# Patient Record
Sex: FEMALE | Race: WHITE | NOT HISPANIC OR LATINO | URBAN - METROPOLITAN AREA
[De-identification: names, ages, dates, MRNs, and addresses within clinical notes are randomized per-mention and may not be internally consistent; named-entity substitution may affect disease eponyms.]

---

## 2023-06-13 ENCOUNTER — WALK IN (OUTPATIENT)
Dept: URGENT CARE | Age: 38
End: 2023-06-13

## 2023-06-13 VITALS
SYSTOLIC BLOOD PRESSURE: 110 MMHG | WEIGHT: 138.67 LBS | OXYGEN SATURATION: 98 % | RESPIRATION RATE: 20 BRPM | TEMPERATURE: 97.8 F | HEIGHT: 64 IN | BODY MASS INDEX: 23.67 KG/M2 | HEART RATE: 80 BPM | DIASTOLIC BLOOD PRESSURE: 82 MMHG

## 2023-06-13 DIAGNOSIS — T59.811A SMOKE INHALATION: ICD-10-CM

## 2023-06-13 DIAGNOSIS — J34.89 IRRITATION, NOSE: Primary | ICD-10-CM

## 2023-06-13 PROCEDURE — 99203 OFFICE O/P NEW LOW 30 MIN: CPT | Performed by: NURSE PRACTITIONER

## 2023-06-13 RX ORDER — LACTIC ACID, L-, CITRIC ACID MONOHYDRATE, AND POTASSIUM BITARTRATE 90; 50; 20 MG/5G; MG/5G; MG/5G
GEL VAGINAL
COMMUNITY

## 2023-06-13 RX ORDER — TRANEXAMIC ACID 650 MG/1
TABLET ORAL EVERY 8 HOURS SCHEDULED
COMMUNITY

## 2023-06-13 RX ORDER — DEXTROMETHORPHAN HYDROBROMIDE AND PROMETHAZINE HYDROCHLORIDE 15; 6.25 MG/5ML; MG/5ML
SYRUP ORAL
COMMUNITY

## 2023-06-13 RX ORDER — AZITHROMYCIN 250 MG/1
TABLET, FILM COATED ORAL
COMMUNITY

## 2023-06-13 RX ORDER — ALBUTEROL SULFATE 90 UG/1
AEROSOL, METERED RESPIRATORY (INHALATION)
COMMUNITY

## 2023-06-13 ASSESSMENT — ENCOUNTER SYMPTOMS
SHORTNESS OF BREATH: 0
GASTROINTESTINAL NEGATIVE: 1
WHEEZING: 0
STRIDOR: 0
EYES NEGATIVE: 1
CONSTITUTIONAL NEGATIVE: 1
COUGH: 1

## 2023-10-06 DIAGNOSIS — O36.80X0 PREGNANCY WITH INCONCLUSIVE FETAL VIABILITY, SINGLE OR UNSPECIFIED FETUS: Primary | ICD-10-CM

## 2023-10-09 ENCOUNTER — OFFICE VISIT (OUTPATIENT)
Dept: OBGYN CLINIC | Facility: CLINIC | Age: 38
End: 2023-10-09
Payer: COMMERCIAL

## 2023-10-09 ENCOUNTER — LAB ENCOUNTER (OUTPATIENT)
Dept: LAB | Age: 38
End: 2023-10-09
Payer: COMMERCIAL

## 2023-10-09 VITALS — SYSTOLIC BLOOD PRESSURE: 136 MMHG | HEART RATE: 112 BPM | DIASTOLIC BLOOD PRESSURE: 77 MMHG | WEIGHT: 132.25 LBS

## 2023-10-09 DIAGNOSIS — O20.9 BLEEDING IN EARLY PREGNANCY: ICD-10-CM

## 2023-10-09 DIAGNOSIS — Z32.01 POSITIVE PREGNANCY TEST: Primary | ICD-10-CM

## 2023-10-09 DIAGNOSIS — O20.9 VAGINAL BLEEDING AFFECTING EARLY PREGNANCY: Primary | ICD-10-CM

## 2023-10-09 LAB
B-HCG SERPL-ACNC: ABNORMAL MIU/ML
CONTROL LINE PRESENT WITH A CLEAR BACKGROUND (YES/NO): YES YES/NO
KIT LOT #: NORMAL NUMERIC
PREGNANCY TEST, URINE: POSITIVE
PROGEST SERPL-MCNC: 11.6 NG/ML
RH BLOOD TYPE: POSITIVE

## 2023-10-09 PROCEDURE — 86900 BLOOD TYPING SEROLOGIC ABO: CPT

## 2023-10-09 PROCEDURE — 86901 BLOOD TYPING SEROLOGIC RH(D): CPT

## 2023-10-09 PROCEDURE — 84144 ASSAY OF PROGESTERONE: CPT

## 2023-10-09 PROCEDURE — 84702 CHORIONIC GONADOTROPIN TEST: CPT

## 2023-10-09 RX ORDER — PROGESTERONE 200 MG/1
200 CAPSULE ORAL NIGHTLY
Qty: 30 CAPSULE | Refills: 0 | Status: SHIPPED | OUTPATIENT
Start: 2023-10-09 | End: 2023-11-08

## 2023-10-09 NOTE — PROGRESS NOTES
Nura Guerrero is a 41year old female  Patient's last menstrual period was 2023 (exact date). Patient presents with:  New Patient  Gyn Problem: Early pregnancy bleeding, pt is 7wks, bleeding started this past Saturday   Other: Patient is very emotional  EP ordered US on 10/6/23 for 23  Patient said YES to student in the room   . Per LMP she is 7w1d. Her periods are regular. OBSTETRICS HISTORY:  OB History    Para Term  AB Living   2 1 1     1   SAB IAB Ectopic Multiple Live Births           1      # Outcome Date GA Lbr [place]/2nd Weight Sex Delivery Anes PTL Lv   2 Current            1 Term 21 39w0d  7 lb 10 oz (3.459 kg) M NORMAL SPONT   WILIAM       GYNE HISTORY:  Periods regular monthly      Sexual activity:   Yes      Partners:   Male                 MEDICAL HISTORY:  History reviewed. No pertinent past medical history. SURGICAL HISTORY:  History reviewed. No pertinent surgical history. SOCIAL HISTORY:  Social History    Socioeconomic History      Marital status: Not on file      Spouse name: Not on file      Number of children: Not on file      Years of education: Not on file      Highest education level: Not on file    Occupational History      Not on file    Tobacco Use      Smoking status: Never      Smokeless tobacco: Never    Vaping Use      Vaping Use: Never used    Substance and Sexual Activity      Alcohol use: Yes        Comment: socially      Drug use: Never      Sexual activity: Yes        Partners: Male    Other Topics      Concerns:        Not on file    Social History Narrative      Not on file    Social Determinants of Health  Financial Resource Strain: Not on file  Food Insecurity: Not on file  Transportation Needs: Not on file  Stress: Not on file  Housing Stability: Not on file    FAMILY HISTORY:  History reviewed. No pertinent family history. MEDICATIONS:  No current outpatient medications on file.     ALLERGIES:  No Known Allergies      PHYSICAL EXAM:   Pelvic Exam:  External Genitalia: normal appearance, hair distribution, and no lesions  Urethral Meatus:  normal in size, location, without lesions and prolapse  Bladder:  No fullness, masses or tenderness  Vagina:  Normal appearance without lesions, no abnormal discharge  Cervix:  Normal without tenderness on motion, dark red blood oozing from cervix   Uterus: normal in size, contour, position, mobility, without tenderness  Adnexa: normal without masses or tenderness  Perineum: normal  Anus: no hemorroids     Assessment & Plan:    1. Positive pregnancy test    - Urine Preg Test [79000]    2. Bleeding in early pregnancy    - HCG, Beta Subunit, Quant; Future  - Progesterone; Future  - Blood Type [E];  Future

## 2023-10-10 NOTE — PROGRESS NOTES
Pt aware of results & recommendations for progesterone vaginally at bedtime until 12 weeks. Verbalized understanding.

## 2023-10-11 ENCOUNTER — LAB ENCOUNTER (OUTPATIENT)
Dept: LAB | Age: 38
End: 2023-10-11
Payer: COMMERCIAL

## 2023-10-11 DIAGNOSIS — O20.9 VAGINAL BLEEDING AFFECTING EARLY PREGNANCY: ICD-10-CM

## 2023-10-11 LAB — B-HCG SERPL-ACNC: ABNORMAL MIU/ML

## 2023-10-11 PROCEDURE — 84702 CHORIONIC GONADOTROPIN TEST: CPT

## 2023-10-12 ENCOUNTER — TELEPHONE (OUTPATIENT)
Facility: CLINIC | Age: 38
End: 2023-10-12

## 2023-10-12 ENCOUNTER — ULTRASOUND ENCOUNTER (OUTPATIENT)
Facility: CLINIC | Age: 38
End: 2023-10-12
Payer: COMMERCIAL

## 2023-10-12 DIAGNOSIS — O36.80X0 PREGNANCY WITH INCONCLUSIVE FETAL VIABILITY, SINGLE OR UNSPECIFIED FETUS: Primary | ICD-10-CM

## 2023-10-12 DIAGNOSIS — O20.9 BLEEDING IN EARLY PREGNANCY: ICD-10-CM

## 2023-10-12 DIAGNOSIS — O20.9 BLEEDING IN EARLY PREGNANCY: Primary | ICD-10-CM

## 2023-10-12 NOTE — TELEPHONE ENCOUNTER
Spoke with patient. Per Ramya's recommendation she would like to proceed with a repeat HCG tomorrow and await Dr Bay recommendation from her [address]. Understanding verbalized.

## 2023-10-12 NOTE — TELEPHONE ENCOUNTER
Pt leaving her US very confused on why we did not schedule her with a doctor. Advised pt the nurse scheduled an ultrasound for her based on TK recommendations. Pt was surprised to find out the provider she saw in [facility] is not a doctor and is not seeing her today. Please advise.

## 2023-10-12 NOTE — PROGRESS NOTES
Patient aware of HCG results and recommendations. Scheduled for US today. Patient verbalized understanding.

## 2023-10-13 ENCOUNTER — LAB ENCOUNTER (OUTPATIENT)
Dept: LAB | Age: 38
End: 2023-10-13
Payer: COMMERCIAL

## 2023-10-13 DIAGNOSIS — O20.9 BLEEDING IN EARLY PREGNANCY: ICD-10-CM

## 2023-10-13 LAB — B-HCG SERPL-ACNC: 9084 MIU/ML

## 2023-10-13 PROCEDURE — 84702 CHORIONIC GONADOTROPIN TEST: CPT

## 2023-10-13 NOTE — TELEPHONE ENCOUNTER
Spoke with pt. Aware HCG order has been placed. She will go today at noon. Will call to discuss ultrasound once results have been reviewed. Verbalized understanding.

## 2023-10-13 NOTE — TELEPHONE ENCOUNTER
Message left per HIPAA form letting pt know her HCG order has been placed. To have HCG repeated today. To call with any questions.

## 2023-10-13 NOTE — TELEPHONE ENCOUNTER
Spoke with pt. Aware HCG is pending. Discussed possible miscarriage. Hemorrhage precautions gives. Advised to stop progesterone if HCG continues to drop. All questions answered. Verbalized understanding.

## 2023-10-16 ENCOUNTER — TELEPHONE (OUTPATIENT)
Facility: CLINIC | Age: 38
End: 2023-10-16

## 2023-10-16 DIAGNOSIS — O03.9 SAB (SPONTANEOUS ABORTION): Primary | ICD-10-CM

## 2023-10-16 NOTE — PROGRESS NOTES
Patient aware of test results and recommendations for repeat HCG level in 1 week. Order placed and routed to Dr. Danny Mckeon for signature. Patient verbalizes understanding.

## 2023-10-16 NOTE — TELEPHONE ENCOUNTER
Pt is still waiting for US results; pt is passing blood clots; pt states she tried to reach Dr on call this weekend and was unable to reach a Dr through the on call service; pls call.

## 2023-10-16 NOTE — TELEPHONE ENCOUNTER
Patient aware of US results and recommendation to repeat HCG level in 1 week. Patient reports light vaginal bleeding, decrease in cramping. Will proceed will blood work as recommended. If bleeding or pain increases patient instructed to call office. Order for HCG placed and routed to Dr. Hitesh Ruiz for signature. Patient verbalizes understanding.

## 2023-10-24 ENCOUNTER — LAB ENCOUNTER (OUTPATIENT)
Dept: LAB | Age: 38
End: 2023-10-24
Attending: OBSTETRICS & GYNECOLOGY

## 2023-10-24 DIAGNOSIS — O03.9 SAB (SPONTANEOUS ABORTION): ICD-10-CM

## 2023-10-24 LAB — B-HCG SERPL-ACNC: 187 MIU/ML

## 2023-10-24 PROCEDURE — 84702 CHORIONIC GONADOTROPIN TEST: CPT

## 2023-11-02 ENCOUNTER — TELEPHONE (OUTPATIENT)
Dept: OBGYN UNIT | Facility: HOSPITAL | Age: 38
End: 2023-11-02

## 2023-11-02 DIAGNOSIS — O03.9 SAB (SPONTANEOUS ABORTION): Primary | ICD-10-CM

## 2023-11-07 ENCOUNTER — LAB ENCOUNTER (OUTPATIENT)
Dept: LAB | Age: 38
End: 2023-11-07
Attending: OBSTETRICS & GYNECOLOGY
Payer: COMMERCIAL

## 2023-11-07 DIAGNOSIS — O03.9 SAB (SPONTANEOUS ABORTION): ICD-10-CM

## 2023-11-07 LAB — B-HCG SERPL-ACNC: 5 MIU/ML

## 2023-11-07 PROCEDURE — 84702 CHORIONIC GONADOTROPIN TEST: CPT | Performed by: OBSTETRICS & GYNECOLOGY

## 2023-11-13 DIAGNOSIS — O03.9 SAB (SPONTANEOUS ABORTION): Primary | ICD-10-CM

## 2023-11-13 NOTE — PROGRESS NOTES
Pt aware of results & recommendations to repeat HCG in 1 week left on voicemail per HIPAA form. Order pended. To call with any questions.

## 2023-11-30 ENCOUNTER — LAB ENCOUNTER (OUTPATIENT)
Dept: LAB | Age: 38
End: 2023-11-30
Attending: OBSTETRICS & GYNECOLOGY
Payer: COMMERCIAL

## 2023-11-30 DIAGNOSIS — O03.9 SAB (SPONTANEOUS ABORTION): ICD-10-CM

## 2023-11-30 LAB — B-HCG SERPL-ACNC: 12 MIU/ML

## 2023-11-30 PROCEDURE — 84702 CHORIONIC GONADOTROPIN TEST: CPT | Performed by: OBSTETRICS & GYNECOLOGY

## 2023-12-01 ENCOUNTER — TELEPHONE (OUTPATIENT)
Facility: CLINIC | Age: 38
End: 2023-12-01

## 2023-12-01 DIAGNOSIS — Z87.59 HX OF ONE MISCARRIAGE: Primary | ICD-10-CM

## 2023-12-01 NOTE — TELEPHONE ENCOUNTER
Pt concern her Hcg rising, some cramping    Denies bleeding    3 5/7 LMP 11/5/23, Pt's been TTC. Did HPT negative. After SAB would wait for hcg to go down close to zero before TTC. Discussed with Dr. Danny Mckeon recommends: Check hcg in 2d & progesterone pended. If uc or  bleeding to call office. Patient verbalized understanding, agreed to and intend to comply with plan of care.

## 2023-12-01 NOTE — TELEPHONE ENCOUNTER
Pt calling to speak to the nurse about her HCG levels rising now after a miscarriage. Pt states they were watching her Hcg closely and she has been trying so she would like to know next steps. Please advise.

## 2023-12-04 ENCOUNTER — LAB ENCOUNTER (OUTPATIENT)
Dept: LAB | Age: 38
End: 2023-12-04
Payer: COMMERCIAL

## 2023-12-04 DIAGNOSIS — Z87.59 HX OF ONE MISCARRIAGE: ICD-10-CM

## 2023-12-04 LAB
B-HCG SERPL-ACNC: 203 MIU/ML
PROGEST SERPL-MCNC: 27.5 NG/ML

## 2023-12-04 PROCEDURE — 84702 CHORIONIC GONADOTROPIN TEST: CPT

## 2023-12-04 PROCEDURE — 84144 ASSAY OF PROGESTERONE: CPT

## 2023-12-04 NOTE — TELEPHONE ENCOUNTER
Pt called HPT +, wanted to know when to do lab test.     Advised to complete hcg progesterone when she's able to determine next POC. Patient verbalized understanding, agreed to and intend to comply with plan of care.

## 2023-12-05 DIAGNOSIS — Z87.59 HX OF ONE MISCARRIAGE: Primary | ICD-10-CM

## 2023-12-05 DIAGNOSIS — O09.299 HISTORY OF MISCARRIAGE, CURRENTLY PREGNANT: ICD-10-CM

## 2023-12-05 NOTE — PROGRESS NOTES
Pt aware of results. LMP- 11/5/23 4 weeks 2 days G-3 P-1 SAB 10/7/2023 HCG down to 5. +HPT 12/4/23. Denies bleeding or abdominal pain. Aware HCG is 203. Will repeat HCG tomorrow to make sure rising appropriately. Order pended. Will route to [facility] for any further recommendations and call pt. To call  the office with any vaginal bleeding or abdominal pain. Verbalized understanding.

## 2023-12-06 ENCOUNTER — LAB ENCOUNTER (OUTPATIENT)
Dept: LAB | Age: 38
End: 2023-12-06
Payer: COMMERCIAL

## 2023-12-06 DIAGNOSIS — O09.299 HISTORY OF MISCARRIAGE, CURRENTLY PREGNANT: ICD-10-CM

## 2023-12-06 LAB — B-HCG SERPL-ACNC: 459 MIU/ML

## 2023-12-06 PROCEDURE — 84702 CHORIONIC GONADOTROPIN TEST: CPT

## 2023-12-07 DIAGNOSIS — Z87.59 HX OF ONE MISCARRIAGE: Primary | ICD-10-CM

## 2023-12-08 ENCOUNTER — LAB ENCOUNTER (OUTPATIENT)
Dept: LAB | Age: 38
End: 2023-12-08
Payer: COMMERCIAL

## 2023-12-08 DIAGNOSIS — Z87.59 HX OF ONE MISCARRIAGE: ICD-10-CM

## 2023-12-08 LAB — B-HCG SERPL-ACNC: 947 MIU/ML

## 2023-12-08 PROCEDURE — 84702 CHORIONIC GONADOTROPIN TEST: CPT

## 2023-12-13 ENCOUNTER — TELEPHONE (OUTPATIENT)
Facility: CLINIC | Age: 38
End: 2023-12-13

## 2023-12-13 NOTE — TELEPHONE ENCOUNTER
Discussed hcg/progesterone results to schedule US/FOB in 2wks. Transferred PSR to schedule appt. Patient verbalized understanding, agreed to and intend to comply with plan of care.

## 2023-12-29 ENCOUNTER — TELEPHONE (OUTPATIENT)
Dept: FAMILY MEDICINE CLINIC | Facility: CLINIC | Age: 38
End: 2023-12-29

## 2023-12-29 ENCOUNTER — TELEPHONE (OUTPATIENT)
Facility: CLINIC | Age: 38
End: 2023-12-29

## 2023-12-29 NOTE — TELEPHONE ENCOUNTER
PT dropped off MCF to be filled out by provider and the faxed to:       [address]    Fax # [phone] ty

## 2024-01-12 ENCOUNTER — INITIAL PRENATAL (OUTPATIENT)
Dept: OBGYN CLINIC | Facility: CLINIC | Age: 39
End: 2024-01-12
Payer: COMMERCIAL

## 2024-01-12 VITALS
BODY MASS INDEX: 22.58 KG/M2 | HEART RATE: 72 BPM | HEIGHT: 64 IN | SYSTOLIC BLOOD PRESSURE: 115 MMHG | WEIGHT: 132.25 LBS | DIASTOLIC BLOOD PRESSURE: 76 MMHG

## 2024-01-12 DIAGNOSIS — Z12.4 CERVICAL CANCER SCREENING: ICD-10-CM

## 2024-01-12 DIAGNOSIS — Z13.79 GENETIC SCREENING: ICD-10-CM

## 2024-01-12 DIAGNOSIS — Z87.59 CONFIRM FETAL VIABILITY WITH HISTORY OF MISCARRIAGE, ULTRASOUND: ICD-10-CM

## 2024-01-12 DIAGNOSIS — O09.521 AMA (ADVANCED MATERNAL AGE) MULTIGRAVIDA 35+, FIRST TRIMESTER: ICD-10-CM

## 2024-01-12 DIAGNOSIS — O36.80X0 CONFIRM FETAL VIABILITY WITH HISTORY OF MISCARRIAGE, ULTRASOUND: ICD-10-CM

## 2024-01-12 DIAGNOSIS — Z34.91 INITIAL OBSTETRIC VISIT IN FIRST TRIMESTER: Primary | ICD-10-CM

## 2024-01-12 PROCEDURE — 3078F DIAST BP <80 MM HG: CPT

## 2024-01-12 PROCEDURE — 87086 URINE CULTURE/COLONY COUNT: CPT

## 2024-01-12 PROCEDURE — 87624 HPV HI-RISK TYP POOLED RSLT: CPT

## 2024-01-12 PROCEDURE — 87591 N.GONORRHOEAE DNA AMP PROB: CPT

## 2024-01-12 PROCEDURE — 3008F BODY MASS INDEX DOCD: CPT

## 2024-01-12 PROCEDURE — 87491 CHLMYD TRACH DNA AMP PROBE: CPT

## 2024-01-12 PROCEDURE — 3074F SYST BP LT 130 MM HG: CPT

## 2024-01-12 PROCEDURE — 88175 CYTOPATH C/V AUTO FLUID REDO: CPT

## 2024-01-12 NOTE — PROGRESS NOTES
Initial OB - 9w3d     DARREN by LMP - her initial US is scheduled 24    OBhx - ,  - no complications   PMHx - . Denies blood transfusion, HIV, hepatitis, HSV, VTE or congenital heart defects   Psurghx - denies  Last pap Unsure, Pap done today     38 year old , EDC by LMP   -Aneuploidy screening discussed     -NIPT - wants - will do at next visit     -Carrier screen - she had it done with her last pregnancy, delivered in [place], will bring copy of the results, does not think she has any positive findings.     AMA   -NIPT   -L2US, Growths, NSTs

## 2024-01-13 ENCOUNTER — TELEPHONE (OUTPATIENT)
Dept: OBGYN CLINIC | Facility: CLINIC | Age: 39
End: 2024-01-13

## 2024-01-13 NOTE — TELEPHONE ENCOUNTER
Returned patient's page.  No answer.  Per patient message on [employer], patient is experiencing bleeding and scheduled for ultrasound on 1/18/2024.  Patient had called to inquire if earlier ultrasound appointment could be made.  Telephone message left for patient.  Patient advised to report to the emergency room if severe abdominal/pelvic pain and/or heavy vaginal bleeding.  Patient advised to contact the office on Monday to inquire about ultrasound appointments if she desires.  Precautions given via telephone message.    Cecily Dukes MD   [facility] - OBGYN

## 2024-01-15 LAB
C TRACH DNA SPEC QL NAA+PROBE: NEGATIVE
HPV I/H RISK 1 DNA SPEC QL NAA+PROBE: NEGATIVE
N GONORRHOEA DNA SPEC QL NAA+PROBE: NEGATIVE

## 2024-01-18 ENCOUNTER — ULTRASOUND ENCOUNTER (OUTPATIENT)
Facility: CLINIC | Age: 39
End: 2024-01-18
Payer: COMMERCIAL

## 2024-01-18 LAB
.: NORMAL
.: NORMAL

## 2024-02-08 ENCOUNTER — TELEPHONE (OUTPATIENT)
Facility: CLINIC | Age: 39
End: 2024-02-08

## 2024-02-09 ENCOUNTER — ROUTINE PRENATAL (OUTPATIENT)
Facility: CLINIC | Age: 39
End: 2024-02-09
Payer: COMMERCIAL

## 2024-02-09 ENCOUNTER — TELEPHONE (OUTPATIENT)
Facility: CLINIC | Age: 39
End: 2024-02-09

## 2024-02-09 VITALS
BODY MASS INDEX: 22.88 KG/M2 | DIASTOLIC BLOOD PRESSURE: 72 MMHG | HEART RATE: 97 BPM | WEIGHT: 134 LBS | HEIGHT: 64 IN | SYSTOLIC BLOOD PRESSURE: 112 MMHG

## 2024-02-09 DIAGNOSIS — Z3A.13 13 WEEKS GESTATION OF PREGNANCY: Primary | ICD-10-CM

## 2024-02-09 DIAGNOSIS — O46.8X1 SUBCHORIONIC HEMATOMA IN FIRST TRIMESTER, SINGLE OR UNSPECIFIED FETUS: ICD-10-CM

## 2024-02-09 DIAGNOSIS — O41.8X10 SUBCHORIONIC HEMATOMA IN FIRST TRIMESTER, SINGLE OR UNSPECIFIED FETUS: ICD-10-CM

## 2024-02-09 DIAGNOSIS — O09.521 AMA (ADVANCED MATERNAL AGE) MULTIGRAVIDA 35+, FIRST TRIMESTER: ICD-10-CM

## 2024-02-09 PROCEDURE — 3008F BODY MASS INDEX DOCD: CPT | Performed by: STUDENT IN AN ORGANIZED HEALTH CARE EDUCATION/TRAINING PROGRAM

## 2024-02-09 PROCEDURE — 3074F SYST BP LT 130 MM HG: CPT | Performed by: STUDENT IN AN ORGANIZED HEALTH CARE EDUCATION/TRAINING PROGRAM

## 2024-02-09 PROCEDURE — 3078F DIAST BP <80 MM HG: CPT | Performed by: STUDENT IN AN ORGANIZED HEALTH CARE EDUCATION/TRAINING PROGRAM

## 2024-02-09 NOTE — PROGRESS NOTES
Initial OB - 13w3d     Discussed subchorionic bleed.  No vaginal bleeding.   No ctx.      DARREN by LMP - her initial US is scheduled 24    38 year old , EDC by LMP   - Carrier in previous preg neg  [ ] follow up NIPT    AMA   -- NIPT as above  [ ] follow up MFM and L2US  [ ] Growths, NSTs     Subchorionic hemorrhage  <1 cm on dating US.  No VB.  Provided reassurance.  Precautions discussed if vaginal bleed

## 2024-02-09 NOTE — TELEPHONE ENCOUNTER
13 3/7 jimenes    AMA    Pt request for NIPS lab draw per Dr. Rodas    Patients name &  verified on lab tubes with patient. NIPS screen lab drawn, patient tolerated well. Specimen placed at . [employer] access, call in 2 weeks for result, Cautioned patient may receive results via email from [employer] that includes gender results discussed. Patient verbalized understanding.       
Infectious Disease

## 2024-02-13 LAB
AMB EXT MYRIAD TRISOMY 13: NEGATIVE
AMB EXT MYRIAD TRISOMY 18: NEGATIVE
AMB EXT MYRIAD TRISOMY 21: NEGATIVE

## 2024-02-15 ENCOUNTER — TELEPHONE (OUTPATIENT)
Facility: CLINIC | Age: 39
End: 2024-02-15

## 2024-02-21 ENCOUNTER — TELEPHONE (OUTPATIENT)
Facility: CLINIC | Age: 39
End: 2024-02-21

## 2024-02-21 NOTE — TELEPHONE ENCOUNTER
NIPS negative, gender result release in pt's [employer] portal. Patient verbalized understanding, agreed to and intend to comply with plan of care.

## 2024-03-06 ENCOUNTER — ROUTINE PRENATAL (OUTPATIENT)
Dept: OBGYN CLINIC | Facility: CLINIC | Age: 39
End: 2024-03-06
Payer: COMMERCIAL

## 2024-03-06 VITALS
DIASTOLIC BLOOD PRESSURE: 75 MMHG | SYSTOLIC BLOOD PRESSURE: 122 MMHG | HEIGHT: 64 IN | BODY MASS INDEX: 23.22 KG/M2 | WEIGHT: 136 LBS | HEART RATE: 93 BPM

## 2024-03-06 DIAGNOSIS — Z34.90 PRENATAL CARE, ANTEPARTUM (HCC): Primary | ICD-10-CM

## 2024-03-06 PROCEDURE — 3074F SYST BP LT 130 MM HG: CPT | Performed by: STUDENT IN AN ORGANIZED HEALTH CARE EDUCATION/TRAINING PROGRAM

## 2024-03-06 PROCEDURE — 3078F DIAST BP <80 MM HG: CPT | Performed by: STUDENT IN AN ORGANIZED HEALTH CARE EDUCATION/TRAINING PROGRAM

## 2024-03-06 PROCEDURE — 3008F BODY MASS INDEX DOCD: CPT | Performed by: STUDENT IN AN ORGANIZED HEALTH CARE EDUCATION/TRAINING PROGRAM

## 2024-03-06 NOTE — PATIENT INSTRUCTIONS
To schedule your appointment for your 20 week ultrasound, call:    [facility] - [place], [place]   [facility]  [facility])  [address] [place] [address], [facility] [address]    [phone]

## 2024-03-06 NOTE — PROGRESS NOTES
KENDAL - 17w1d     Pt feeling some flutters of fetal movement  Has some air hunger, not severe, reassured. No chest pain  Pt is  and notices is more fatigued in general, too - reassured most likely common sx of pregnancy      38 year old , EDC by LMP c/w 10wk US  - Carrier in previous preg neg  - NIPT neg    AMA   -- NIPT as above  [ ] follow up MFM and L2US - ordered, advised to schedule appt  [ ] Growths, NSTs     Subchorionic hemorrhage  <1 cm on dating US.  No VB.  Provided reassurance.  Precautions discussed if vaginal bleed

## 2024-03-15 ENCOUNTER — TELEPHONE (OUTPATIENT)
Facility: CLINIC | Age: 39
End: 2024-03-15

## 2024-03-28 ENCOUNTER — OFFICE VISIT (OUTPATIENT)
Dept: PERINATAL CARE | Facility: HOSPITAL | Age: 39
End: 2024-03-28
Attending: STUDENT IN AN ORGANIZED HEALTH CARE EDUCATION/TRAINING PROGRAM
Payer: COMMERCIAL

## 2024-03-28 ENCOUNTER — ULTRASOUND ENCOUNTER (OUTPATIENT)
Dept: PERINATAL CARE | Facility: HOSPITAL | Age: 39
End: 2024-03-28
Attending: OBSTETRICS & GYNECOLOGY
Payer: COMMERCIAL

## 2024-03-28 VITALS
SYSTOLIC BLOOD PRESSURE: 103 MMHG | HEART RATE: 74 BPM | WEIGHT: 139 LBS | HEIGHT: 64 IN | BODY MASS INDEX: 23.73 KG/M2 | DIASTOLIC BLOOD PRESSURE: 64 MMHG

## 2024-03-28 DIAGNOSIS — O09.522 MULTIGRAVIDA OF ADVANCED MATERNAL AGE IN SECOND TRIMESTER (HCC): Primary | ICD-10-CM

## 2024-03-28 DIAGNOSIS — Z82.79 FAMILY HISTORY OF CONGENITAL HEART DISEASE: ICD-10-CM

## 2024-03-28 DIAGNOSIS — O09.529 AMA (ADVANCED MATERNAL AGE) MULTIGRAVIDA 35+ (HCC): ICD-10-CM

## 2024-03-28 PROCEDURE — 76811 OB US DETAILED SNGL FETUS: CPT | Performed by: OBSTETRICS & GYNECOLOGY

## 2024-03-28 NOTE — PROGRESS NOTES
Outpatient Maternal-Fetal Medicine Consultation    Dear Dr. Arriaga    Thank you for requesting ultrasound evaluation and maternal fetal medicine consultation on your patient Mine Patel.  As you are aware she is a 38 year old female  with a jimenes pregnancy and an Estimated Date of Delivery: 24.  A maternal-fetal medicine consultation was requested secondary to Advanced Maternal Age.  Her prenatal records and labs were reviewed.    Her son was noted to have a hole in his heart that they are watching.  They do think it is between the 2 upper chambers.  He is being observed until age 3 when they will determine if a procedure is needed or not.    She is worried that something could happen during this pregnancy.  She now has friends who have had abnormal outcomes.  She has had a first trimester loss   Her previous pregnancy was uncomplicated.    ROS    HISTORY  OB History    Para Term  AB Living   3 1 1   1 1   SAB IAB Ectopic Multiple Live Births           1      # Outcome Date GA Lbr [place]/2nd Weight Sex Delivery Anes PTL Lv   3 Current            2 Term 21 39w0d  7 lb 10 oz (3.459 kg) M NORMAL SPONT   WILIAM      Birth Comments: has a hole in his heart, discovered at birth, can't do much until he is 4yo   1 AB                Allergies:  No Known Allergies   Current Meds:  Current Outpatient Medications   Medication Sig Dispense Refill    [id number] OR           HISTORY:  No past medical history on file.   No past surgical history on file.   Family History   Problem Relation Age of Onset    Hyperlipidemia Maternal Grandfather     Ovarian Cancer Paternal Grandmother       Social History     Socioeconomic History    Marital status:    Tobacco Use    Smoking status: Never    Smokeless tobacco: Never   Vaping Use    Vaping Use: Never used   Substance and Sexual Activity    Alcohol use: Yes     Comment: socially    Drug use: Never    Sexual activity: Yes      Partners: Male     Social Determinants of Health     Financial Resource Strain: Low Risk  (1/5/2024)    Financial Resource Strain     Difficulty of Paying Living Expenses: Not hard at all     Med Affordability: No   Food Insecurity: No Food Insecurity (1/5/2024)    Food Insecurity     Food Insecurity: Never true   Transportation Needs: No Transportation Needs (1/5/2024)    Transportation Needs     Lack of Transportation: No   Stress: No Stress Concern Present (1/5/2024)    Stress     Feeling of Stress : No   Housing Stability: Low Risk  (1/5/2024)    Housing Stability     Housing Instability: No          PHYSICAL EXAMINATION:  /64 (BP Location: Right arm, Patient Position: Sitting, Cuff Size: adult)   Pulse 74   Ht 5' 4\" (1.626 m)   Wt 139 lb (63 kg)   LMP 11/07/2023 (Exact Date)   BMI 23.86 kg/m²   Physical Exam  Constitutional:       Appearance: Normal appearance.   Abdominal:      Palpations: Abdomen is soft.      Tenderness: There is no abdominal tenderness.   Neurological:      Mental Status: She is alert.   Psychiatric:         Mood and Affect: Mood normal.         Behavior: Behavior normal.         OBSTETRIC ULTRASOUND  The patient had a level II ultrasound today which revealed size consistent with dates and a normal detailed anatomic survey.  Ultrasound Findings:  Single IUP in cephalic presentation.    Placenta is posterior.   A 3 vessel cord is noted.  Cardiac activity is present at 152 bpm   g ( 0 lb 13 oz);   MVP is 4.5 cm .     The fetal measurements are consistent with the established EDC. No ultrasound evidence of structural abnormalities are seen today. The nasal bone is present. No ultrasound evidence of markers for aneuploidy are seen. She understands that ultrasound exam cannot exclude genetic abnormalities and that genetic testing is recommended. The limitations of ultrasound were discussed.     Uterus and adnexa appeared normal  today on US  See PACS/Imaging Tab For Complete  Ultrasound Report  I interpreted the results and reviewed them with the patient.    DISCUSSION  During her visit we discussed and reviewed the following issues:  ADVANCED MATERNAL AGE    Background  I reviewed with the patient that pregnancies in women of advanced maternal age (35 or older at delivery) are associated with elevated risks. Specifically, there is a higher rate of:  Fetal malformations  Preeclampsia  Gestational diabetes  Intrauterine fetal death    As a result, enhanced pregnancy surveillance is advised for these patients including a comprehensive ultrasound to assess for fetal malformations (at 20 weeks) and a third trimester ultrasound assessment for fetal growth (at 32 weeks). In addition, weekly NST's (initiating at 36 weeks gestation for women 35-39 years and at 32 weeks gestation for women 40 years and older) are also advised. Routine obstetric care is more than adequate to assess for gestational diabetes and preeclampsia; hence, no further significant alterations in obstetric care are advised.    Medical Complications    Women 35 years of age or older can expect to experience two to three fold higher rates of hospitalization,  delivery, and pregnancy-related complications when compared to their younger counterparts.  The two most common medical problems complicating these  pregnanccies are hypertension and diabetes.   The incidence of preeclampsia in the general obstetric population is 3 to 4 percent; this increases to 5 to 10 percent in women over age 40 and is as high as 35 percent in women over age 50.   The incidence of gestational diabetes in the general obstetric population is 3 percent, rising to 7 to 12 percent in women over age 40 and 20 percent in women over age 50.  Women 35 years of age or older are more likely to be delivered by . The  delivery rate in the general obstetric population of the [place] is almost 30 percent, compared to almost 50 percent in  women over age 40 to 45 and almost 80 percent in women age 50 to 63.          Fetal Death        A decision analysis tool using data from the [place] [employer]  Database predicted a strategy of weekly antepartum testing and labor induction would lower the risk of unexplained fetal death in women 35 years of age or older. In this model, weekly testing starting at 36 weeks of gestation would drop the risk of fetal death from 5.2 to 1.3 per 1000 pregnancies. While a policy of antepartum testing in older women does increase the chance that a women will be induced (71 inductions per fetal death averted) and thereby increases her risk of having a  delivery, only 14 additional cesareans would need to be performed to avert one unexplained fetal death.  Hence, weekly NST's are advised for women of advanced maternal age; testing should be initiated at 36 weeks for women 35-39 years and at 32 weeks for women 40 years and older.    Fetal Malformations    Cardiac malformations, clubfoot, and diaphragmatic hernia appear to occur with increased frequency in offspring of older women. These abnormalities are structural and unrelated to aneuploidy, thus they would not be detected by karyotype analysis.  For these reasons a complete, detailed ultrasound (level II) is advised even if the fetus has a normal karyotype.      Fetal Aneuploidy      Invasive Testing  I offered invasive genetic testing (amniocentesis, chorionic villus sampling) after reviewing the diagnostic accuracy of these tests as well as the procedure associated loss rate (1:500 for genetic amniocentesis).    She ultimately does not desire invasive genetic testing.     We discussed  the increased risk of chromosomal abnormalities associated with advanced maternal age at age  39 year old. She understands that ultrasound exam cannot exclude potential genetic abnormalities.  Her estimated risk based on maternal age at term with any chromosome abnormality is  about 1: 100 and with Down Syndrome is about 1: 150.   We also discussed the risks and benefits of having  genetic testing (CVS and amniocentesis) performed.      Non-invasive Pregnancy Testing (NIPT)  I reviewed current non-invasive screening options. Currently non-invasive pregnancy testing (NIPT) offers the highest detection rate (with the lowest false positive rate) for the detection of fetal aneuploidy amongst high-risk patients. The limitations of detailed mid-trimester sonography was reviewed with the patient. First trimester screening and second trimester multiple-marker serum serum screening as alternative aneuploidy screening options were also reviewed. However, both of these tests are associated with lower detection and higher false positive rates.          Congenital Heart Disease    Congenital heart disease (CHD) is the most common congenital disorder in newborns.   Prenatal identification and management of fetal cardiac abnormalities are important because congenital anomalies are the leading cause of infant death and congenital heart disease accounts for 30 to 50 percent of these deaths.  Prenatal diagnosis of cardiac disease provides parents an opportunity to obtain prognostic information prior to birth, learn about treatment options before and after delivery, make decisions concerning the management approach that is best for their family, and plan for specific needs at birth.   Critical CHD, defined as requiring surgery or catheter-based intervention in the first year of life, occurs in approximately 25 percent of those with CHD. Although many newborns with critical CHD are symptomatic and identified soon after birth, others are not diagnosed until after discharge from the birth hospitalization. In infants with critical cardiac lesions, the risk of morbidity and mortality increases when there is a delay in diagnosis and timely referral to a tertiary center with expertise in treating these patients.      The reported prevalence of CHD at birth ranges from 6 to 13 per 1000 live births.  Offspring of women with congenital heart disease are at increased risk of congenital heart defects. The risk of recurrent congenital heart disease varies with the specific parental defect.   The largest experience is from a series of [id number] pregnancies in which one parent or sibling had congenital heart disease. Recurrence in the fetus was detected by echocardiography 2.7 percent.     Indications for Fetal Echocardiogram --with higher risk profile (estimated >2 percent absolute risk) include:  ?Maternal pregestational diabetes mellitus   ?Maternal phenylketonuria (uncontrolled)   ?Maternal autoantibodies (SSA/SSB)  ?Maternal cardiac teratogens   ?Maternal first trimester rubella infection   ?Maternal infection with suspicion of fetal myocarditis   ?Pregnancy conceived by assisted reproduction technology (ART)   ?CHD in first degree relative of fetus   ?First or second degree relative with disorder with Mendelian inheritance with CHD association   ?Fetal cardiac abnormality suspected on obstetric ultrasound  ?Fetal noncardiac abnormality suspected on obstetric ultrasound  ?Fetal chromosome abnormality  ?Fetal tachycardia or bradycardia, or frequent or persistent irregular heart rhythm   ?Fetal increased nuchal translucency >95 percentile (?3 mm) on first trimester sonogram   ?Monochorionic twinning   ?Fetal hydrops or effusions      Indications with lower risk profile (estimated >1 and <2 percent absolute risk) include:  ?Maternal medications (anticonvulsants, lithium, vitamin A, paroxetine, NSAIDs in first/second trimester)  ?CHD in second degree relative of fetus  ?Fetal abnormality of the umbilical cord or placenta (eg, single umbilical artery, agenesis of the ductus venosus)  ?Fetal intraabdominal venous anomaly      All of their questions were answered to their satisfaction.  They would like to proceed with the fetal echo.  They  understands limitations of fetal echoes and ability to diagnose a PFO.  A large ASD could be seen.      IMPRESSION:  IUP at 20w2d   AMA --NIPT low risk, declines invasive testing   Child with hole in heart    RECOMMENDATIONS:  Continue care with Dr. Arriaga  Follow-up Growth ultrasound with BPP at 32 weeks.  Weekly NST's at 36 weeks.  Fetal echo 24 weeks          Thank you for allowing me to participate in the care of your patient.  Please do not hesitate to contact me if additional questions or concerns arise.      Tonia Leonard M.D.    40 minutes spent in review of records, patient consultation, documentation and coordination of care.  The relevant clinical matter(s) are summarized above.     Note to patient and family  The 21st Century Cures Act makes medical notes available to patients in the interest of transparency.  However, please be advised that this is a medical document.  It is intended as [id number] communication.  It is written and medical language may contain abbreviations or verbiage that are technical and unfamiliar.  It may appear blunt or direct.  Medical documents are intended to carry relevant information, facts as evident, and the clinical opinion of the practitioner.

## 2024-04-05 ENCOUNTER — ROUTINE PRENATAL (OUTPATIENT)
Facility: CLINIC | Age: 39
End: 2024-04-05
Payer: COMMERCIAL

## 2024-04-05 VITALS
SYSTOLIC BLOOD PRESSURE: 120 MMHG | WEIGHT: 146 LBS | DIASTOLIC BLOOD PRESSURE: 62 MMHG | HEIGHT: 64 IN | BODY MASS INDEX: 24.92 KG/M2 | HEART RATE: 93 BPM

## 2024-04-05 DIAGNOSIS — Z34.90 PRENATAL CARE, ANTEPARTUM (HCC): Primary | ICD-10-CM

## 2024-04-05 PROCEDURE — 3078F DIAST BP <80 MM HG: CPT | Performed by: STUDENT IN AN ORGANIZED HEALTH CARE EDUCATION/TRAINING PROGRAM

## 2024-04-05 PROCEDURE — 3008F BODY MASS INDEX DOCD: CPT | Performed by: STUDENT IN AN ORGANIZED HEALTH CARE EDUCATION/TRAINING PROGRAM

## 2024-04-05 PROCEDURE — 3074F SYST BP LT 130 MM HG: CPT | Performed by: STUDENT IN AN ORGANIZED HEALTH CARE EDUCATION/TRAINING PROGRAM

## 2024-04-05 NOTE — PROGRESS NOTES
KENDAL - 21w3d     Pt is feeling regular fetal movement  Her son jumped on her earlier today and his knee jabbed her side and her belly. No bleeding or persistent pain  D/w pt that with abdominal trauma once viable gestational age we would typically monitor FHR on L&D  Normal doptones today, audible fetal movement  She is also concerned about a cyst on her labia - exam with small inclusion cyst at posterior fourchette, d/w pt could remove at time of delivery if has epidural      38 year old , EDC by LMP c/w 10wk US  - Carrier in previous preg neg  - NIPT neg  - never did prenatal labs - reminded to go to lab    AMA   - normal L2US  - growth US 32wk - appt   - weekly NSTs 36wk    Fhx congenital heart defect  - p'ts first child has hole in heart (may be ASD vs PFO?), no surgery, doing observation  -per MFM fetal echo 24 wk - appt

## 2024-04-10 ENCOUNTER — LAB ENCOUNTER (OUTPATIENT)
Dept: LAB | Age: 39
End: 2024-04-10
Payer: COMMERCIAL

## 2024-04-10 DIAGNOSIS — Z34.91 INITIAL OBSTETRIC VISIT IN FIRST TRIMESTER (HCC): ICD-10-CM

## 2024-04-10 LAB
ANTIBODY SCREEN: NEGATIVE
BASOPHILS # BLD AUTO: 0.05 X10(3) UL (ref 0–0.2)
BASOPHILS NFR BLD AUTO: 0.6 %
EOSINOPHIL # BLD AUTO: 0.16 X10(3) UL (ref 0–0.7)
EOSINOPHIL NFR BLD AUTO: 1.8 %
ERYTHROCYTE [DISTWIDTH] IN BLOOD BY AUTOMATED COUNT: 14.6 %
HBV SURFACE AG SER-ACNC: <0.1 [IU]/L
HBV SURFACE AG SERPL QL IA: NONREACTIVE
HCT VFR BLD AUTO: 32.2 %
HCV AB SERPL QL IA: NONREACTIVE
HGB BLD-MCNC: 11.1 G/DL
IMM GRANULOCYTES # BLD AUTO: 0.08 X10(3) UL (ref 0–1)
IMM GRANULOCYTES NFR BLD: 0.9 %
LYMPHOCYTES # BLD AUTO: 1.68 X10(3) UL (ref 1–4)
LYMPHOCYTES NFR BLD AUTO: 18.9 %
MCH RBC QN AUTO: 32.6 PG (ref 26–34)
MCHC RBC AUTO-ENTMCNC: 34.5 G/DL (ref 31–37)
MCV RBC AUTO: 94.7 FL
MONOCYTES # BLD AUTO: 0.59 X10(3) UL (ref 0.1–1)
MONOCYTES NFR BLD AUTO: 6.7 %
NEUTROPHILS # BLD AUTO: 6.31 X10 (3) UL (ref 1.5–7.7)
NEUTROPHILS # BLD AUTO: 6.31 X10(3) UL (ref 1.5–7.7)
NEUTROPHILS NFR BLD AUTO: 71.1 %
PLATELET # BLD AUTO: 271 10(3)UL (ref 150–450)
RBC # BLD AUTO: 3.4 X10(6)UL
RH BLOOD TYPE: POSITIVE
T PALLIDUM AB SER QL IA: NONREACTIVE
WBC # BLD AUTO: 8.9 X10(3) UL (ref 4–11)

## 2024-04-10 PROCEDURE — 86901 BLOOD TYPING SEROLOGIC RH(D): CPT

## 2024-04-10 PROCEDURE — 86762 RUBELLA ANTIBODY: CPT

## 2024-04-10 PROCEDURE — 86780 TREPONEMA PALLIDUM: CPT

## 2024-04-10 PROCEDURE — 87340 HEPATITIS B SURFACE AG IA: CPT

## 2024-04-10 PROCEDURE — 87389 HIV-1 AG W/HIV-1&-2 AB AG IA: CPT

## 2024-04-10 PROCEDURE — 86850 RBC ANTIBODY SCREEN: CPT

## 2024-04-10 PROCEDURE — 86900 BLOOD TYPING SEROLOGIC ABO: CPT

## 2024-04-10 PROCEDURE — 86803 HEPATITIS C AB TEST: CPT

## 2024-04-10 PROCEDURE — 85025 COMPLETE CBC W/AUTO DIFF WBC: CPT

## 2024-04-11 LAB
RUBV IGG SER QL: POSITIVE
RUBV IGG SER-ACNC: 27.6 IU/ML (ref 10–?)

## 2024-05-03 ENCOUNTER — ROUTINE PRENATAL (OUTPATIENT)
Facility: CLINIC | Age: 39
End: 2024-05-03
Payer: COMMERCIAL

## 2024-05-03 VITALS
DIASTOLIC BLOOD PRESSURE: 64 MMHG | BODY MASS INDEX: 25.71 KG/M2 | WEIGHT: 150.63 LBS | HEART RATE: 78 BPM | HEIGHT: 64 IN | SYSTOLIC BLOOD PRESSURE: 118 MMHG

## 2024-05-03 DIAGNOSIS — Z3A.25 25 WEEKS GESTATION OF PREGNANCY (HCC): ICD-10-CM

## 2024-05-03 DIAGNOSIS — Z34.82 PRENATAL CARE, SUBSEQUENT PREGNANCY IN SECOND TRIMESTER (HCC): Primary | ICD-10-CM

## 2024-05-03 DIAGNOSIS — O09.522 MULTIGRAVIDA OF ADVANCED MATERNAL AGE IN SECOND TRIMESTER (HCC): ICD-10-CM

## 2024-05-03 PROCEDURE — 3008F BODY MASS INDEX DOCD: CPT | Performed by: OBSTETRICS & GYNECOLOGY

## 2024-05-03 PROCEDURE — 3078F DIAST BP <80 MM HG: CPT | Performed by: OBSTETRICS & GYNECOLOGY

## 2024-05-03 PROCEDURE — 3074F SYST BP LT 130 MM HG: CPT | Performed by: OBSTETRICS & GYNECOLOGY

## 2024-05-03 NOTE — PROGRESS NOTES
Patient c/o pelvic pressure - cervix closed - reassured  She is also concerned about a cyst on her labia - exam with small inclusion cyst at posterior fourchette, d/w pt could remove at time of delivery if has epidural      EDC by LMP c/w 10wk US  - Carrier in previous preg neg  - NIPT neg      AMA   - normal L2US  - growth US 32wk - appt 6/19  - weekly NSTs 36wk    Fhx congenital heart defect  - p'ts first child has hole in heart (may be ASD vs PFO?), no surgery, doing observation  -per MFM fetal echo 24 wk - appt 4/24 - cancelled for financial reasons - discussed importance of the testing - patient mat consider

## 2024-05-28 ENCOUNTER — ROUTINE PRENATAL (OUTPATIENT)
Facility: CLINIC | Age: 39
End: 2024-05-28

## 2024-05-28 VITALS
WEIGHT: 157.63 LBS | HEIGHT: 64 IN | BODY MASS INDEX: 26.91 KG/M2 | HEART RATE: 98 BPM | DIASTOLIC BLOOD PRESSURE: 68 MMHG | SYSTOLIC BLOOD PRESSURE: 108 MMHG

## 2024-05-28 DIAGNOSIS — Z34.83 PRENATAL CARE, SUBSEQUENT PREGNANCY IN THIRD TRIMESTER (HCC): Primary | ICD-10-CM

## 2024-05-28 DIAGNOSIS — Z3A.29 29 WEEKS GESTATION OF PREGNANCY (HCC): ICD-10-CM

## 2024-05-28 DIAGNOSIS — Z23 NEED FOR VACCINATION: ICD-10-CM

## 2024-05-28 PROCEDURE — 3008F BODY MASS INDEX DOCD: CPT | Performed by: OBSTETRICS & GYNECOLOGY

## 2024-05-28 PROCEDURE — 90715 TDAP VACCINE 7 YRS/> IM: CPT | Performed by: OBSTETRICS & GYNECOLOGY

## 2024-05-28 PROCEDURE — 90471 IMMUNIZATION ADMIN: CPT | Performed by: OBSTETRICS & GYNECOLOGY

## 2024-05-28 PROCEDURE — 3074F SYST BP LT 130 MM HG: CPT | Performed by: OBSTETRICS & GYNECOLOGY

## 2024-05-28 PROCEDURE — 3078F DIAST BP <80 MM HG: CPT | Performed by: OBSTETRICS & GYNECOLOGY

## 2024-05-28 NOTE — PROGRESS NOTES
Patient has no complaints  - reminded 1 GTT and CBC, HIV and t.pal ordered  - TDAP today     small inclusion cyst at posterior fourchette, d/w pt could remove at time of delivery if has epidural      EDC by LMP c/w 10wk US  - Carrier in previous preg neg  - NIPT neg      AMA   - normal L2US  - growth US 32wk - appt 6/19  - weekly NSTs 36wk    Fhx congenital heart defect  - p'ts first child has hole in heart (may be ASD vs PFO?), no surgery, doing observation  -per MFM fetal echo 24 wk - appt 4/24 - cancelled for financial reasons - discussed importance of the testing - patient mat consider

## 2024-06-01 ENCOUNTER — LAB ENCOUNTER (OUTPATIENT)
Dept: LAB | Age: 39
End: 2024-06-01
Attending: OBSTETRICS & GYNECOLOGY
Payer: COMMERCIAL

## 2024-06-01 DIAGNOSIS — Z34.82 PRENATAL CARE, SUBSEQUENT PREGNANCY IN SECOND TRIMESTER (HCC): ICD-10-CM

## 2024-06-01 LAB
BASOPHILS # BLD AUTO: 0.07 X10(3) UL (ref 0–0.2)
BASOPHILS NFR BLD AUTO: 0.7 %
EOSINOPHIL # BLD AUTO: 0.23 X10(3) UL (ref 0–0.7)
EOSINOPHIL NFR BLD AUTO: 2.4 %
ERYTHROCYTE [DISTWIDTH] IN BLOOD BY AUTOMATED COUNT: 13.4 %
GLUCOSE 1H P GLC SERPL-MCNC: 97 MG/DL
HCT VFR BLD AUTO: 33.3 %
HGB BLD-MCNC: 11.1 G/DL
IMM GRANULOCYTES # BLD AUTO: 0.21 X10(3) UL (ref 0–1)
IMM GRANULOCYTES NFR BLD: 2.2 %
LYMPHOCYTES # BLD AUTO: 1.27 X10(3) UL (ref 1–4)
LYMPHOCYTES NFR BLD AUTO: 13.3 %
MCH RBC QN AUTO: 31.3 PG (ref 26–34)
MCHC RBC AUTO-ENTMCNC: 33.3 G/DL (ref 31–37)
MCV RBC AUTO: 93.8 FL
MONOCYTES # BLD AUTO: 0.78 X10(3) UL (ref 0.1–1)
MONOCYTES NFR BLD AUTO: 8.2 %
NEUTROPHILS # BLD AUTO: 6.99 X10 (3) UL (ref 1.5–7.7)
NEUTROPHILS # BLD AUTO: 6.99 X10(3) UL (ref 1.5–7.7)
NEUTROPHILS NFR BLD AUTO: 73.2 %
PLATELET # BLD AUTO: 212 10(3)UL (ref 150–450)
RBC # BLD AUTO: 3.55 X10(6)UL
WBC # BLD AUTO: 9.6 X10(3) UL (ref 4–11)

## 2024-06-01 PROCEDURE — 36415 COLL VENOUS BLD VENIPUNCTURE: CPT

## 2024-06-01 PROCEDURE — 85025 COMPLETE CBC W/AUTO DIFF WBC: CPT

## 2024-06-01 PROCEDURE — 82950 GLUCOSE TEST: CPT

## 2024-06-12 ENCOUNTER — ROUTINE PRENATAL (OUTPATIENT)
Facility: CLINIC | Age: 39
End: 2024-06-12
Payer: COMMERCIAL

## 2024-06-12 ENCOUNTER — LAB ENCOUNTER (OUTPATIENT)
Dept: LAB | Age: 39
End: 2024-06-12
Payer: COMMERCIAL

## 2024-06-12 VITALS
HEART RATE: 104 BPM | HEIGHT: 64 IN | BODY MASS INDEX: 27.11 KG/M2 | SYSTOLIC BLOOD PRESSURE: 110 MMHG | DIASTOLIC BLOOD PRESSURE: 64 MMHG | WEIGHT: 158.81 LBS

## 2024-06-12 DIAGNOSIS — Z36.9 ANTENATAL SCREENING ENCOUNTER (HCC): Primary | ICD-10-CM

## 2024-06-12 DIAGNOSIS — Z36.9 ANTENATAL SCREENING ENCOUNTER (HCC): ICD-10-CM

## 2024-06-12 LAB — T PALLIDUM AB SER QL IA: NONREACTIVE

## 2024-06-12 PROCEDURE — 3078F DIAST BP <80 MM HG: CPT

## 2024-06-12 PROCEDURE — 87389 HIV-1 AG W/HIV-1&-2 AB AG IA: CPT

## 2024-06-12 PROCEDURE — 86780 TREPONEMA PALLIDUM: CPT

## 2024-06-12 PROCEDURE — 3074F SYST BP LT 130 MM HG: CPT

## 2024-06-12 PROCEDURE — 3008F BODY MASS INDEX DOCD: CPT

## 2024-06-12 NOTE — PROGRESS NOTES
KENDAL 31w1d    She is feeling dizzy and light headed lately - she likes to drink carbonated water, increase in adequate hydration discussed (drink regular water)   -3rd tri HIV and T pallidium discussed and ordered     EDC by LMP c/w 10wk US  - Carrier in previous preg neg  - NIPT neg  -1 hr GTT & CBC done  -Tdap done 5/28/24          AMA   - normal L2US  - growth US 32wk - appt 6/19  - weekly NSTs 36wk     Fhx congenital heart defect  - p'ts first child has hole in heart (may be ASD vs PFO?), no surgery, doing observation  -per MFM fetal echo 24 wk - appt 4/24 - cancelled for financial reasons - discussed importance of the testing - patient mat consider        small inclusion cyst at posterior fourchette, d/w pt could remove at time of delivery if has epidural

## 2024-06-19 ENCOUNTER — ULTRASOUND ENCOUNTER (OUTPATIENT)
Dept: PERINATAL CARE | Facility: HOSPITAL | Age: 39
End: 2024-06-19
Attending: OBSTETRICS & GYNECOLOGY

## 2024-06-19 ENCOUNTER — OFFICE VISIT (OUTPATIENT)
Dept: PERINATAL CARE | Facility: HOSPITAL | Age: 39
End: 2024-06-19
Attending: INTERNAL MEDICINE

## 2024-06-19 VITALS
DIASTOLIC BLOOD PRESSURE: 67 MMHG | SYSTOLIC BLOOD PRESSURE: 114 MMHG | BODY MASS INDEX: 27.14 KG/M2 | HEIGHT: 64 IN | WEIGHT: 159 LBS | HEART RATE: 121 BPM

## 2024-06-19 DIAGNOSIS — O09.523 MULTIGRAVIDA OF ADVANCED MATERNAL AGE IN THIRD TRIMESTER (HCC): Primary | ICD-10-CM

## 2024-06-19 DIAGNOSIS — Z82.79 FAMILY HISTORY OF CONGENITAL HEART DISEASE: ICD-10-CM

## 2024-06-19 DIAGNOSIS — O09.523 MULTIGRAVIDA OF ADVANCED MATERNAL AGE IN THIRD TRIMESTER (HCC): ICD-10-CM

## 2024-06-19 PROCEDURE — 76816 OB US FOLLOW-UP PER FETUS: CPT | Performed by: OBSTETRICS & GYNECOLOGY

## 2024-06-19 PROCEDURE — 76819 FETAL BIOPHYS PROFIL W/O NST: CPT

## 2024-06-19 NOTE — PROGRESS NOTES
Outpatient Maternal-Fetal Medicine Consultation    Dear Dr. Arriaga    Thank you for requesting ultrasound evaluation and maternal fetal medicine consultation on your patient Mine Patel.  As you are aware she is a 38 year old female  with a jimenes pregnancy and an Estimated Date of Delivery: 24.  A maternal-fetal medicine  f/u is today.  Her prenatal records and labs were reviewed.    She had an induction last time and had a horrible experience.  This is a different hospital different OB group.  She does not want an induction.  She would like to go into labor naturally.  However she does want an epidural in the hospital.  She does not want to feel the pain of contractions.      ROS    HISTORY  OB History    Para Term  AB Living   3 1 1   1 1   SAB IAB Ectopic Multiple Live Births           1      # Outcome Date GA Lbr [place]/2nd Weight Sex Type Anes PTL Lv   3 Current            2 Term 21 39w0d  7 lb 10 oz (3.459 kg) M NORMAL SPONT   WILIAM      Birth Comments: has a hole in his heart, discovered at birth, can't do much until he is 3yo   1 AB                Allergies:  No Known Allergies   Current Meds:  Current Outpatient Medications   Medication Sig Dispense Refill    Ferrous Sulfate (IRON OR) Take by mouth.      [id number] OR           HISTORY:  No past medical history on file.   No past surgical history on file.   Family History   Problem Relation Age of Onset    Hyperlipidemia Maternal Grandfather     Ovarian Cancer Paternal Grandmother       Social History     Socioeconomic History    Marital status:    Tobacco Use    Smoking status: Never    Smokeless tobacco: Never   Vaping Use    Vaping status: Never Used   Substance and Sexual Activity    Alcohol use: Yes     Comment: socially    Drug use: Never    Sexual activity: Yes     Partners: Male     Social Determinants of Health     Financial Resource Strain: Low Risk  (2024)    Financial Resource  Strain     Difficulty of Paying Living Expenses: Not hard at all     Med Affordability: No   Food Insecurity: No Food Insecurity (1/5/2024)    Food Insecurity     Food Insecurity: Never true   Transportation Needs: No Transportation Needs (1/5/2024)    Transportation Needs     Lack of Transportation: No   Stress: No Stress Concern Present (1/5/2024)    Stress     Feeling of Stress : No   Housing Stability: Low Risk  (1/5/2024)    Housing Stability     Housing Instability: No          PHYSICAL EXAMINATION:  /67 (BP Location: Right arm, Patient Position: Sitting, Cuff Size: adult)   Pulse (!) 121   Ht 5' 4\" (1.626 m)   Wt 159 lb (72.1 kg)   LMP 11/07/2023 (Exact Date)   BMI 27.29 kg/m²   Physical Exam  Constitutional:       Appearance: Normal appearance.   Abdominal:      Palpations: Abdomen is soft.      Tenderness: There is no abdominal tenderness.   Neurological:      Mental Status: She is alert.   Psychiatric:         Mood and Affect: Mood normal.         Behavior: Behavior normal.         OBSTETRIC ULTRASOUND  The patient had a follow-up growth and BPP ultrasound today which revealed normal interval fetal growth and a BPP of 8/8.   Ultrasound Findings:  Single IUP in cephalic presentation.    Placenta is posterior.   A 3 vessel cord is noted.  Cardiac activity is present at 144 bpm  EFW 2348 g ( 5 lb 3 oz); 75%.    NARDA is  16.1 cm.  MVP is 5.5 cm  BPP is 8/8.     The fetal measurements are consistent with established EDC. No gross ultrasound evidence of structural abnormalities are seen today. The patient understands that ultrasound cannot rule out all structural and chromosomal abnormalities.   See PACS/Imaging Tab For Complete Ultrasound Report  I interpreted the results and reviewed them with the patient.    DISCUSSION  During her visit we discussed and reviewed the following issues:  ADVANCED MATERNAL AGE    Background  I reviewed with the patient that pregnancies in women of advanced maternal age  (35 or older at delivery) are associated with elevated risks. Specifically, there is a higher rate of:  Fetal malformations  Preeclampsia  Gestational diabetes  Intrauterine fetal death   Please see previous [facility] detailed discussion.       Congenital Heart Disease   Please see previous [facility] detailed discussion.     GLUCOSE 1HR OB   Date Value Ref Range Status   06/01/2024 97 See comment mg/dL Final     Comment:     If the plasma glucose level measured after 1 hour is >=130, 135 or 140 mg/dl proceed to \"Glucose Tolerance, 100 gm (0 hr, 1 hr, 2hr, 3hr), Gestational (ADA)\" test on a separate day, as clinically indicated.          All of their questions were answered to their satisfaction.  They would like to proceed with the fetal echo.  They understands limitations of fetal echoes and ability to diagnose a PFO.  A large ASD could be seen.      Mine seemed quite anxious today about wondering how she would know if she was going into labor.  Because she was so anxious, I asked what had happened in her previous delivery.  It was at this point that she revealed that it  was a nightmare and not a good experience.  She felt a lot of pain with the contractions.  She did have an epidural eventually.  I went over with her that the recommendations from my point of view to come into labor and delivery are the following: Contractions every 4 minutes for an hour, leaking water spotting or bleeding, decreased fetal movement.  [place] lives 30 minutes from the hospital in [place].  She had a 30-hour induction last time.  We discussed that although babies do come quicker the second time around, likely it would not be A precipitous delivery and likely she will have time to get to the hospital.      I recommended that she talk with her OB regarding her previous experience and what her hopes are for this 1.  We discussed the option of an early epidural since she does not want to have pain.  It is important for her to not have an  induction.  We discussed that sometimes groups and [place] people between 35 to 39 weeks at 39 to 40 weeks.  However it is a conversation involving her.  Once people are 40 I recommend delivery at 39 to 40 weeks.  Between the ages of 35-39, patient can  discuss with her OB about what timing would be best for her.      IMPRESSION:  IUP at 32w1d   AMA --NIPT low risk, declines invasive testing   Child with hole in heart    RECOMMENDATIONS:  Continue care with Dr. Arriaga  Weekly NST's at 36 weeks.   echo prior to discharge from the hospital.            Thank you for allowing me to participate in the care of your patient.  Please do not hesitate to contact me if additional questions or concerns arise.      Tonia Leonard M.D.    40 minutes spent in review of records, patient consultation, documentation and coordination of care.  The relevant clinical matter(s) are summarized above.     Note to patient and family  The 21st Century Cures Act makes medical notes available to patients in the interest of transparency.  However, please be advised that this is a medical document.  It is intended as [id number] communication.  It is written and medical language may contain abbreviations or verbiage that are technical and unfamiliar.  It may appear blunt or direct.  Medical documents are intended to carry relevant information, facts as evident, and the clinical opinion of the practitioner.

## 2024-06-26 ENCOUNTER — ROUTINE PRENATAL (OUTPATIENT)
Facility: CLINIC | Age: 39
End: 2024-06-26

## 2024-06-26 VITALS
WEIGHT: 160 LBS | BODY MASS INDEX: 27.31 KG/M2 | HEART RATE: 100 BPM | DIASTOLIC BLOOD PRESSURE: 63 MMHG | HEIGHT: 64 IN | SYSTOLIC BLOOD PRESSURE: 100 MMHG

## 2024-06-26 DIAGNOSIS — Z3A.33 33 WEEKS GESTATION OF PREGNANCY (HCC): ICD-10-CM

## 2024-06-26 DIAGNOSIS — Z34.83 PRENATAL CARE, SUBSEQUENT PREGNANCY IN THIRD TRIMESTER (HCC): Primary | ICD-10-CM

## 2024-06-26 PROCEDURE — 3008F BODY MASS INDEX DOCD: CPT | Performed by: OBSTETRICS & GYNECOLOGY

## 2024-06-26 PROCEDURE — 3074F SYST BP LT 130 MM HG: CPT | Performed by: OBSTETRICS & GYNECOLOGY

## 2024-06-26 PROCEDURE — 3078F DIAST BP <80 MM HG: CPT | Performed by: OBSTETRICS & GYNECOLOGY

## 2024-06-26 NOTE — PROGRESS NOTES
Patient feels her stomach \"dropped\" concerned about PTL -reassured  Patient feels strongly against IOL, had bad experience with 1st pregnancy  -3rd tri HIV and T pallidium done     EDC by LMP c/w 10wk US  - Carrier in previous preg neg  - NIPT neg  -1 hr GTT & CBC , HIV and T.pal done  -Tdap done 24          AMA   - normal L2US  - growth US 32wk - normal growth  - weekly NSTs 36wk     Fhx congenital heart defect  - p'ts first child has hole in heart (may be ASD vs PFO?), no surgery, doing observation  -per MFM fetal echo 24 wk - appt  - cancelled for financial reasons - discussed importance of the testing - per MFM -  ECHO after delivery       small inclusion cyst at posterior fourchette, d/w pt could remove at time of delivery if has epidural

## 2024-07-10 ENCOUNTER — ROUTINE PRENATAL (OUTPATIENT)
Facility: CLINIC | Age: 39
End: 2024-07-10
Payer: COMMERCIAL

## 2024-07-10 VITALS
HEIGHT: 64 IN | SYSTOLIC BLOOD PRESSURE: 100 MMHG | BODY MASS INDEX: 27.55 KG/M2 | HEART RATE: 112 BPM | DIASTOLIC BLOOD PRESSURE: 62 MMHG | WEIGHT: 161.38 LBS

## 2024-07-10 DIAGNOSIS — O09.523 AMA (ADVANCED MATERNAL AGE) MULTIGRAVIDA 35+, THIRD TRIMESTER (HCC): Primary | ICD-10-CM

## 2024-07-10 PROCEDURE — 99213 OFFICE O/P EST LOW 20 MIN: CPT

## 2024-07-10 NOTE — PROGRESS NOTES
KENDAL 35w1d - visit # 8    She is doing well, no complaints   -GBS discussed - plan to do it a next visit     EDC by LMP c/w 10wk US  - Carrier in previous preg neg  - NIPT neg  -1 hr GTT & CBC , HIV and T.pal done  -Tdap done 24  -3rd tri HIV and syphilis screen done         AMA   - normal L2US  - growth US 32wk - normal growth  - weekly NSTs 36wk     Fhx congenital heart defect  - p'ts first child has hole in heart (may be ASD vs PFO?), no surgery, doing observation  -per MFM fetal echo 24 wk - appt  - cancelled for financial reasons - discussed importance of the testing - per MFM -  ECHO after delivery        small inclusion cyst at posterior fourchette, d/w pt could remove at time of delivery if has epidural    RTC 1 wk for NST and GBS

## 2024-07-16 ENCOUNTER — ROUTINE PRENATAL (OUTPATIENT)
Facility: CLINIC | Age: 39
End: 2024-07-16
Payer: COMMERCIAL

## 2024-07-16 VITALS
HEIGHT: 64 IN | DIASTOLIC BLOOD PRESSURE: 66 MMHG | SYSTOLIC BLOOD PRESSURE: 102 MMHG | WEIGHT: 164.38 LBS | HEART RATE: 106 BPM | BODY MASS INDEX: 28.06 KG/M2

## 2024-07-16 DIAGNOSIS — Z34.83 PRENATAL CARE, SUBSEQUENT PREGNANCY IN THIRD TRIMESTER (HCC): Primary | ICD-10-CM

## 2024-07-16 DIAGNOSIS — O09.522 MULTIGRAVIDA OF ADVANCED MATERNAL AGE IN SECOND TRIMESTER (HCC): ICD-10-CM

## 2024-07-16 DIAGNOSIS — Z3A.36 36 WEEKS GESTATION OF PREGNANCY (HCC): ICD-10-CM

## 2024-07-16 PROCEDURE — 99213 OFFICE O/P EST LOW 20 MIN: CPT | Performed by: OBSTETRICS & GYNECOLOGY

## 2024-07-16 PROCEDURE — 87150 DNA/RNA AMPLIFIED PROBE: CPT | Performed by: OBSTETRICS & GYNECOLOGY

## 2024-07-16 PROCEDURE — 87081 CULTURE SCREEN ONLY: CPT | Performed by: OBSTETRICS & GYNECOLOGY

## 2024-07-16 PROCEDURE — 59025 FETAL NON-STRESS TEST: CPT | Performed by: OBSTETRICS & GYNECOLOGY

## 2024-07-16 NOTE — PROGRESS NOTES
She is doing well      GBS done    EDC by LMP c/w 10wk US  - Carrier in previous preg neg  - NIPT neg  -1 hr GTT & CBC , HIV and T.pal done  -Tdap done 24  -3rd tri HIV and syphilis screen done         AMA   - normal L2US  - growth US 32wk - normal growth  - weekly NSTs 36wk     Fhx congenital heart defect  - p'ts first child has hole in heart (may be ASD vs PFO?), no surgery, doing observation  -per MFM fetal echo 24 wk - appt  - cancelled for financial reasons - discussed importance of the testing - per MFM -  ECHO after delivery        small inclusion cyst at left labia, d/w pt could remove at time of delivery if has epidural

## 2024-07-18 LAB — GROUP B STREP BY PCR FOR PCR OVT: NEGATIVE

## 2024-07-24 ENCOUNTER — ROUTINE PRENATAL (OUTPATIENT)
Facility: CLINIC | Age: 39
End: 2024-07-24
Payer: COMMERCIAL

## 2024-07-24 VITALS
HEART RATE: 83 BPM | HEIGHT: 64 IN | SYSTOLIC BLOOD PRESSURE: 100 MMHG | DIASTOLIC BLOOD PRESSURE: 72 MMHG | WEIGHT: 166 LBS | BODY MASS INDEX: 28.34 KG/M2

## 2024-07-24 DIAGNOSIS — O09.523 AMA (ADVANCED MATERNAL AGE) MULTIGRAVIDA 35+, THIRD TRIMESTER (HCC): ICD-10-CM

## 2024-07-24 DIAGNOSIS — O09.522 MULTIGRAVIDA OF ADVANCED MATERNAL AGE IN SECOND TRIMESTER (HCC): Primary | ICD-10-CM

## 2024-07-24 PROCEDURE — 3074F SYST BP LT 130 MM HG: CPT

## 2024-07-24 PROCEDURE — 3008F BODY MASS INDEX DOCD: CPT

## 2024-07-24 PROCEDURE — 59025 FETAL NON-STRESS TEST: CPT

## 2024-07-24 PROCEDURE — 3078F DIAST BP <80 MM HG: CPT

## 2024-07-24 NOTE — PROGRESS NOTES
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    NST reactive, baseline 130's, moderate variability, accels to 160's   SVE closed/ thick/ -3 cephalic. IOL discussed, wants to wait for labor. Dw/pt  not recommended to go past 41 wks       EDC by LMP c/w 10wk US  - Carrier in previous preg neg  - NIPT neg  -1 hr GTT & CBC , HIV and T.pal done  -Tdap done 24  -3rd tri HIV and syphilis screen done   -GBS neg         AMA   - normal L2US  - growth US 32wk - normal growth  - weekly NSTs 36wk     Fhx congenital heart defect  - p'ts first child has hole in heart (may be ASD vs PFO?), no surgery, doing observation  -per MFM fetal echo 24 wk - appt  - cancelled for financial reasons - discussed importance of the testing - per MFM -  ECHO after delivery        small inclusion cyst at left labia, d/w pt could remove at time of delivery if has epidural     RTC 1 wk for NST

## 2024-07-29 ENCOUNTER — ROUTINE PRENATAL (OUTPATIENT)
Facility: CLINIC | Age: 39
End: 2024-07-29
Payer: COMMERCIAL

## 2024-07-29 ENCOUNTER — TELEPHONE (OUTPATIENT)
Facility: CLINIC | Age: 39
End: 2024-07-29

## 2024-07-29 VITALS
DIASTOLIC BLOOD PRESSURE: 66 MMHG | BODY MASS INDEX: 28.24 KG/M2 | SYSTOLIC BLOOD PRESSURE: 104 MMHG | WEIGHT: 165.38 LBS | HEIGHT: 64 IN | HEART RATE: 84 BPM

## 2024-07-29 DIAGNOSIS — O09.523 AMA (ADVANCED MATERNAL AGE) MULTIGRAVIDA 35+, THIRD TRIMESTER (HCC): Primary | ICD-10-CM

## 2024-07-29 NOTE — PROGRESS NOTES
KENDAL - 37w6d     Pt had called today with a few concerns, brought in for appt today instead of tomorrow  Earlier today noticed a \"glob\" of red-brown chunky stuff that had come out of vagina into toilet, total about size of golfball, didn't look like mitzy blood.  Also not sure if she is leaking, she has noticed underwear is more moist last few days, not sure if is urine vs amniotic fluid. Not constantly leaking out, but really just not sure  Hips also feel more out of place  Baby very active    Spec exam: small amt mucus discharge, no pooling, neg for ROM  SVE: 1-1.5cm/40/-2 vertex    Discussed labor precautions      EDC by LMP c/w 10wk US  - Carrier in previous preg neg  - NIPT neg  -1 hr GTT & CBC , HIV and T.pal done  -Tdap done 24  -3rd tri HIV and syphilis screen done   -GBS neg   -IOL discussed (last visit at 37w1d), wants to wait for labor. Dw/pt  not recommended to go past 41 wks      AMA   - normal L2US  - growth US 32wk - normal growth  - weekly NSTs 36wk     Fhx congenital heart defect  - p'ts first child has hole in heart (may be ASD vs PFO?), no surgery, doing observation  -per MFM fetal echo 24 wk - appt  - cancelled for financial reasons - discussed importance of the testing - per MFM -  ECHO after delivery        small inclusion cyst at left labia, d/w pt could remove at time of delivery if has epidural     RTC 1 wk for NST

## 2024-07-29 NOTE — TELEPHONE ENCOUNTER
Reason Mucus Plug   Last seen  2024   History    38    Onset This morning 2024   Assessment Spoke with patient. This morning patient went to the bathroom and noticed a brown/red mucus plug in the toilet. Patient does state she has some discomfort and some vaginal pressure but does not believe she is experiencing any contractions. She is experiencing some minor lower back pain. Patient was very active yesterday. She was busy cleaning/nesting. Patient did state that she has had a watery discharge the last couple days but was unsure if it was urine or her membranes rupturing. Patient states she still feels fetal movement. Patient denies painful urination and constipation.    Plan  Scheduled patient to come in to the office to day to be examined.    Follow up Appointment  24 @3:45 PM with Dr. Byers   Patient verbalized understanding, agreed to and intend to comply with plan of care.

## 2024-08-06 ENCOUNTER — ROUTINE PRENATAL (OUTPATIENT)
Facility: CLINIC | Age: 39
End: 2024-08-06
Payer: COMMERCIAL

## 2024-08-06 VITALS
DIASTOLIC BLOOD PRESSURE: 52 MMHG | WEIGHT: 168 LBS | HEART RATE: 101 BPM | BODY MASS INDEX: 28.68 KG/M2 | HEIGHT: 64 IN | SYSTOLIC BLOOD PRESSURE: 114 MMHG

## 2024-08-06 DIAGNOSIS — O09.523 MULTIGRAVIDA OF ADVANCED MATERNAL AGE IN THIRD TRIMESTER (HCC): ICD-10-CM

## 2024-08-06 DIAGNOSIS — Z3A.39 39 WEEKS GESTATION OF PREGNANCY (HCC): ICD-10-CM

## 2024-08-06 DIAGNOSIS — Z34.83 PRENATAL CARE, SUBSEQUENT PREGNANCY IN THIRD TRIMESTER (HCC): Primary | ICD-10-CM

## 2024-08-06 PROCEDURE — 59025 FETAL NON-STRESS TEST: CPT | Performed by: OBSTETRICS & GYNECOLOGY

## 2024-08-06 PROCEDURE — 3074F SYST BP LT 130 MM HG: CPT | Performed by: OBSTETRICS & GYNECOLOGY

## 2024-08-06 PROCEDURE — 3078F DIAST BP <80 MM HG: CPT | Performed by: OBSTETRICS & GYNECOLOGY

## 2024-08-06 PROCEDURE — 3008F BODY MASS INDEX DOCD: CPT | Performed by: OBSTETRICS & GYNECOLOGY

## 2024-08-06 NOTE — PROGRESS NOTES
Patient has no complaints  - NST reactive    Spec exam: small amt mucus discharge, no pooling, neg for ROM  SVE:1-2 cm/40/-2 vertex    Discussed labor precautions      EDC by LMP c/w 10wk US  - Carrier in previous preg neg  - NIPT neg  -1 hr GTT & CBC , HIV and T.pal done  -Tdap done 24  -3rd tri HIV and syphilis screen done   -GBS neg   -IOL discussed (last visit at 37w1d), wants to wait for labor. Dw/pt  not recommended to go past 41 wks      AMA   - normal L2US  - growth US 32wk - normal growth  - weekly NSTs 36wk     Fhx congenital heart defect  - p'ts first child has hole in heart (may be ASD vs PFO?), no surgery, doing observation  -per MFM fetal echo 24 wk - appt  - cancelled for financial reasons - discussed importance of the testing - per MFM -  ECHO after delivery        small inclusion cyst at left labia, d/w pt could remove at time of delivery if has epidural

## 2024-08-11 PROBLEM — O09.523 AMA (ADVANCED MATERNAL AGE) MULTIGRAVIDA 35+, THIRD TRIMESTER (HCC): Status: ACTIVE | Noted: 2024-01-12

## 2024-08-11 PROBLEM — O26.03 EXCESSIVE WEIGHT GAIN DURING PREGNANCY IN THIRD TRIMESTER (HCC): Status: ACTIVE | Noted: 2024-08-11

## 2024-08-11 PROBLEM — O09.523 MULTIGRAVIDA OF ADVANCED MATERNAL AGE IN THIRD TRIMESTER (HCC): Status: ACTIVE | Noted: 2024-01-12

## 2024-08-11 NOTE — PROGRESS NOTES
KENDAL - visit #14 - outside [facility]. Billed as E&M    Chief Complaint   Patient presents with    Prenatal Care     39w6d NST   Chaperone declines   Partner here     Lost mucus plug about 11 days ago  Then had some vaginal pink spotting on two episodes  The past few days nipples have been leaking.     +FM. Some mild intermittent contractions for a few weeks. No leaking fluid, vaginal bleeding, No headache. Vision slightly blurred over the past 1 month. Can't say she has noticed any floaters. Using heating pad for some back pain. No epigastric pain.      Vitals:    24 0936   BP: 104/58   Pulse: 83   Weight: 168 lb 9.6 oz (76.5 kg)   Height: 64\"      39 year old  at 39w6d  DARREN 24 by LMP c/w 10wk US  O+/GBS neg     - Carrier in previous preg neg  - NIPT neg  -1 hr GTT & CBC , HIV and T.pal done  -Tdap done 24  -3rd tri HIV and syphilis screen done  -Cervix 1.5/50/-2, cephalic. Membrane sweeping done 2024. Small red blood on glove.     Delivery planning   -IOL discussed, pt wants to wait for labor. Dw/pt  not recommended to go past 41 wk   -recommend schedule IOL - message sent to     Dwain for dates, 2024 at 39w6d   -NST reactive  -will check growth US - our clinic has some openings today     AMA   - L2 US normal   - growth US 32wk - normal growth  - weekly NSTs 36wk     Fhx congenital heart defect  -pt's first child has hole in heart (may be ASD vs PFO?), no surgery, doing observation  -per MFM fetal echo 24 wk - appt  - cancelled for financial reasons - discussed importance of the testing - per MFM -  ECHO after delivery     Small inclusion cyst at left labia  -d/w pt could remove at time of delivery if has epidural    RTC 1 wk with NST

## 2024-08-11 NOTE — PATIENT INSTRUCTIONS
Recommend induction of labor at 39-40 wk  -Fetal lung maturity should be adequate.     -The longer the pregnancy progresses:   Fetus will gain more weight (can be 1/2 lb per week at term) & require more oxygen   Placenta ages & becomes less efficient at delivering oxygen to the fetus   Increased risk for high blood pressure     -\"Larger fetus\" is relative. Fetus has to be compatible with maternal pelvis. No one can predict this. Can only know if fetus is descending through the pelvis during the labor     -There is a point at which the placenta is insufficient to support the fetus through the labor. No one can predict this (in the absence of already concerning  testing)    -A fetus that cannot get its oxygen needs met during labor will manifest fetal distress/intolerance to labor and will require  section if remote from delivery    -A fetus that is larger and/or has broader shoulders/larger chest & abdominal circumference is at increased risk of shoulder dystocia, during which the fetal shoulder gets stuck on the maternal pubic bone. This can result in fetal and maternal injuries.   Potential fetal injuries:  -nerve damage to arm and neck (mainly temporary, rarely permanent)  -broken arm or clavicle  -low oxygen level that can result in brain damage, cerebral palsy, fetal/ death  Potential maternal injuries:   -third or fourth degree perineal lacerations  -tailbone injury  -need for emergent  section (higher risk for infection, hemorrhage, organ damage)     -A fetus that is too large to pass through the maternal bony pelvis will be delivered via unscheduled  section with higher risk of fetal infection, maternal infection, & maternal hemorrhage.

## 2024-08-12 ENCOUNTER — ULTRASOUND ENCOUNTER (OUTPATIENT)
Facility: CLINIC | Age: 39
End: 2024-08-12
Payer: COMMERCIAL

## 2024-08-12 ENCOUNTER — TELEPHONE (OUTPATIENT)
Facility: CLINIC | Age: 39
End: 2024-08-12

## 2024-08-12 ENCOUNTER — ROUTINE PRENATAL (OUTPATIENT)
Facility: CLINIC | Age: 39
End: 2024-08-12
Payer: COMMERCIAL

## 2024-08-12 VITALS
HEIGHT: 64 IN | SYSTOLIC BLOOD PRESSURE: 104 MMHG | DIASTOLIC BLOOD PRESSURE: 58 MMHG | WEIGHT: 168.63 LBS | HEART RATE: 83 BPM | BODY MASS INDEX: 28.79 KG/M2

## 2024-08-12 DIAGNOSIS — O09.523 AMA (ADVANCED MATERNAL AGE) MULTIGRAVIDA 35+, THIRD TRIMESTER (HCC): ICD-10-CM

## 2024-08-12 DIAGNOSIS — O26.03 EXCESSIVE WEIGHT GAIN DURING PREGNANCY IN THIRD TRIMESTER (HCC): ICD-10-CM

## 2024-08-12 DIAGNOSIS — O36.5930 POOR FETAL GROWTH AFFECTING MANAGEMENT OF MOTHER IN THIRD TRIMESTER, SINGLE OR UNSPECIFIED FETUS (HCC): Primary | ICD-10-CM

## 2024-08-12 DIAGNOSIS — Z82.79 FAMILY HISTORY OF CONGENITAL HEART DISEASE: ICD-10-CM

## 2024-08-12 PROBLEM — O40.9XX0 POLYHYDRAMNIOS, ANTEPARTUM COMPLICATION (HCC): Status: ACTIVE | Noted: 2024-08-12

## 2024-08-12 PROBLEM — O36.63X0 EXCESSIVE FETAL GROWTH AFFECTING MANAGEMENT OF PREGNANCY IN THIRD TRIMESTER (HCC): Status: ACTIVE | Noted: 2024-08-12

## 2024-08-12 PROCEDURE — 3074F SYST BP LT 130 MM HG: CPT | Performed by: OBSTETRICS & GYNECOLOGY

## 2024-08-12 PROCEDURE — 99214 OFFICE O/P EST MOD 30 MIN: CPT | Performed by: OBSTETRICS & GYNECOLOGY

## 2024-08-12 PROCEDURE — 3008F BODY MASS INDEX DOCD: CPT | Performed by: OBSTETRICS & GYNECOLOGY

## 2024-08-12 PROCEDURE — 3078F DIAST BP <80 MM HG: CPT | Performed by: OBSTETRICS & GYNECOLOGY

## 2024-08-12 PROCEDURE — 59025 FETAL NON-STRESS TEST: CPT | Performed by: OBSTETRICS & GYNECOLOGY

## 2024-08-12 NOTE — TELEPHONE ENCOUNTER
Discussed per Dr. Lutz office visit notes today:   L2 US normal   - growth US 32wk - normal growth    Patient verbalized understanding, agreed to and intend to comply with plan of care.

## 2024-08-12 NOTE — TELEPHONE ENCOUNTER
Please call patient to go over the growth Ultrasound that was done today   Patient schedule induction for Wednesday but would like to know the Ultrasound results and if this affects the date at all. Please advise.   Advised patient she may not hear until after 3pm today when Dr. Lutz is done with patients.

## 2024-08-14 ENCOUNTER — ANESTHESIA (OUTPATIENT)
Dept: OBGYN UNIT | Facility: HOSPITAL | Age: 39
End: 2024-08-14
Payer: COMMERCIAL

## 2024-08-14 ENCOUNTER — APPOINTMENT (OUTPATIENT)
Dept: OBGYN CLINIC | Facility: HOSPITAL | Age: 39
End: 2024-08-14
Payer: COMMERCIAL

## 2024-08-14 ENCOUNTER — HOSPITAL ENCOUNTER (INPATIENT)
Facility: HOSPITAL | Age: 39
LOS: 2 days | Discharge: HOME | End: 2024-08-16
Attending: OBSTETRICS & GYNECOLOGY | Admitting: OBSTETRICS & GYNECOLOGY
Payer: COMMERCIAL

## 2024-08-14 ENCOUNTER — ANESTHESIA EVENT (OUTPATIENT)
Dept: OBGYN UNIT | Facility: HOSPITAL | Age: 39
End: 2024-08-14
Payer: COMMERCIAL

## 2024-08-14 PROBLEM — Z34.90 PREGNANCY (HCC): Status: ACTIVE | Noted: 2024-08-14

## 2024-08-14 PROBLEM — O09.523 MULTIGRAVIDA OF ADVANCED MATERNAL AGE IN THIRD TRIMESTER (HCC): Status: ACTIVE | Noted: 2024-08-14

## 2024-08-14 LAB
ANTIBODY SCREEN: NEGATIVE
BASOPHILS # BLD AUTO: 0.08 X10(3) UL (ref 0–0.2)
BASOPHILS NFR BLD AUTO: 0.8 %
EOSINOPHIL # BLD AUTO: 0.17 X10(3) UL (ref 0–0.7)
EOSINOPHIL NFR BLD AUTO: 1.7 %
ERYTHROCYTE [DISTWIDTH] IN BLOOD BY AUTOMATED COUNT: 13.5 %
HCT VFR BLD AUTO: 32.7 %
HGB BLD-MCNC: 11.6 G/DL
IMM GRANULOCYTES # BLD AUTO: 0.34 X10(3) UL (ref 0–1)
IMM GRANULOCYTES NFR BLD: 3.4 %
LYMPHOCYTES # BLD AUTO: 1.78 X10(3) UL (ref 1–4)
LYMPHOCYTES NFR BLD AUTO: 17.7 %
MCH RBC QN AUTO: 32.1 PG (ref 26–34)
MCHC RBC AUTO-ENTMCNC: 35.5 G/DL (ref 31–37)
MCV RBC AUTO: 90.6 FL
MONOCYTES # BLD AUTO: 0.85 X10(3) UL (ref 0.1–1)
MONOCYTES NFR BLD AUTO: 8.4 %
NEUTROPHILS # BLD AUTO: 6.84 X10 (3) UL (ref 1.5–7.7)
NEUTROPHILS # BLD AUTO: 6.84 X10(3) UL (ref 1.5–7.7)
NEUTROPHILS NFR BLD AUTO: 68 %
PLATELET # BLD AUTO: 192 10(3)UL (ref 150–450)
RBC # BLD AUTO: 3.61 X10(6)UL
RH BLOOD TYPE: POSITIVE
T PALLIDUM AB SER QL IA: NONREACTIVE
WBC # BLD AUTO: 10.1 X10(3) UL (ref 4–11)

## 2024-08-14 PROCEDURE — 59400 OBSTETRICAL CARE: CPT | Performed by: OBSTETRICS & GYNECOLOGY

## 2024-08-14 PROCEDURE — 0KQM0ZZ REPAIR PERINEUM MUSCLE, OPEN APPROACH: ICD-10-PCS | Performed by: OBSTETRICS & GYNECOLOGY

## 2024-08-14 PROCEDURE — 10907ZC DRAINAGE OF AMNIOTIC FLUID, THERAPEUTIC FROM PRODUCTS OF CONCEPTION, VIA NATURAL OR ARTIFICIAL OPENING: ICD-10-PCS | Performed by: OBSTETRICS & GYNECOLOGY

## 2024-08-14 PROCEDURE — 3E033VJ INTRODUCTION OF OTHER HORMONE INTO PERIPHERAL VEIN, PERCUTANEOUS APPROACH: ICD-10-PCS | Performed by: OBSTETRICS & GYNECOLOGY

## 2024-08-14 RX ORDER — TERBUTALINE SULFATE 1 MG/ML
0.25 INJECTION, SOLUTION SUBCUTANEOUS AS NEEDED
Status: DISCONTINUED | OUTPATIENT
Start: 2024-08-14 | End: 2024-08-14 | Stop reason: HOSPADM

## 2024-08-14 RX ORDER — CITRIC ACID/SODIUM CITRATE 334-500MG
30 SOLUTION, ORAL ORAL AS NEEDED
Status: DISCONTINUED | OUTPATIENT
Start: 2024-08-14 | End: 2024-08-14 | Stop reason: HOSPADM

## 2024-08-14 RX ORDER — MISOPROSTOL 200 UG/1
TABLET ORAL
Status: DISCONTINUED
Start: 2024-08-14 | End: 2024-08-14 | Stop reason: WASHOUT

## 2024-08-14 RX ORDER — ACETAMINOPHEN 500 MG
500 TABLET ORAL EVERY 6 HOURS PRN
Status: DISCONTINUED | OUTPATIENT
Start: 2024-08-14 | End: 2024-08-16

## 2024-08-14 RX ORDER — ACETAMINOPHEN 500 MG
500 TABLET ORAL EVERY 6 HOURS PRN
Status: DISCONTINUED | OUTPATIENT
Start: 2024-08-14 | End: 2024-08-14 | Stop reason: HOSPADM

## 2024-08-14 RX ORDER — METHYLERGONOVINE MALEATE 0.2 MG/ML
INJECTION INTRAVENOUS
Status: COMPLETED
Start: 2024-08-14 | End: 2024-08-14

## 2024-08-14 RX ORDER — SIMETHICONE 80 MG
80 TABLET,CHEWABLE ORAL 3 TIMES DAILY PRN
Status: DISCONTINUED | OUTPATIENT
Start: 2024-08-14 | End: 2024-08-16

## 2024-08-14 RX ORDER — LIDOCAINE HYDROCHLORIDE AND EPINEPHRINE 15; 5 MG/ML; UG/ML
INJECTION, SOLUTION EPIDURAL AS NEEDED
Status: DISCONTINUED | OUTPATIENT
Start: 2024-08-14 | End: 2024-08-14 | Stop reason: SURG

## 2024-08-14 RX ORDER — TRANEXAMIC ACID 10 MG/ML
INJECTION, SOLUTION INTRAVENOUS
Status: DISCONTINUED
Start: 2024-08-14 | End: 2024-08-14 | Stop reason: WASHOUT

## 2024-08-14 RX ORDER — DEXTROSE, SODIUM CHLORIDE, SODIUM LACTATE, POTASSIUM CHLORIDE, AND CALCIUM CHLORIDE 5; .6; .31; .03; .02 G/100ML; G/100ML; G/100ML; G/100ML; G/100ML
INJECTION, SOLUTION INTRAVENOUS AS NEEDED
Status: DISCONTINUED | OUTPATIENT
Start: 2024-08-14 | End: 2024-08-14 | Stop reason: HOSPADM

## 2024-08-14 RX ORDER — BISACODYL 10 MG
10 SUPPOSITORY, RECTAL RECTAL ONCE AS NEEDED
Status: DISCONTINUED | OUTPATIENT
Start: 2024-08-14 | End: 2024-08-16

## 2024-08-14 RX ORDER — BUPIVACAINE HCL/0.9 % NACL/PF 0.25 %
5 PLASTIC BAG, INJECTION (ML) EPIDURAL AS NEEDED
Status: DISCONTINUED | OUTPATIENT
Start: 2024-08-14 | End: 2024-08-15

## 2024-08-14 RX ORDER — ONDANSETRON 2 MG/ML
4 INJECTION INTRAMUSCULAR; INTRAVENOUS EVERY 6 HOURS PRN
Status: DISCONTINUED | OUTPATIENT
Start: 2024-08-14 | End: 2024-08-14 | Stop reason: HOSPADM

## 2024-08-14 RX ORDER — ACETAMINOPHEN 500 MG
1000 TABLET ORAL EVERY 6 HOURS PRN
Status: DISCONTINUED | OUTPATIENT
Start: 2024-08-14 | End: 2024-08-16

## 2024-08-14 RX ORDER — SODIUM CHLORIDE, SODIUM LACTATE, POTASSIUM CHLORIDE, CALCIUM CHLORIDE 600; 310; 30; 20 MG/100ML; MG/100ML; MG/100ML; MG/100ML
INJECTION, SOLUTION INTRAVENOUS CONTINUOUS
Status: DISCONTINUED | OUTPATIENT
Start: 2024-08-14 | End: 2024-08-14 | Stop reason: HOSPADM

## 2024-08-14 RX ORDER — IBUPROFEN 600 MG/1
600 TABLET ORAL ONCE AS NEEDED
Status: COMPLETED | OUTPATIENT
Start: 2024-08-14 | End: 2024-08-14

## 2024-08-14 RX ORDER — LIDOCAINE HYDROCHLORIDE 10 MG/ML
INJECTION, SOLUTION EPIDURAL; INFILTRATION; INTRACAUDAL; PERINEURAL AS NEEDED
Status: DISCONTINUED | OUTPATIENT
Start: 2024-08-14 | End: 2024-08-14 | Stop reason: SURG

## 2024-08-14 RX ORDER — METHYLERGONOVINE MALEATE 0.2 MG/ML
0.2 INJECTION INTRAVENOUS ONCE
Status: COMPLETED | OUTPATIENT
Start: 2024-08-14 | End: 2024-08-14

## 2024-08-14 RX ORDER — BUPIVACAINE HYDROCHLORIDE 2.5 MG/ML
INJECTION, SOLUTION EPIDURAL; INFILTRATION; INTRACAUDAL AS NEEDED
Status: DISCONTINUED | OUTPATIENT
Start: 2024-08-14 | End: 2024-08-14 | Stop reason: SURG

## 2024-08-14 RX ORDER — LIDOCAINE HYDROCHLORIDE AND EPINEPHRINE 15; 5 MG/ML; UG/ML
INJECTION, SOLUTION EPIDURAL
Status: DISPENSED
Start: 2024-08-14 | End: 2024-08-15

## 2024-08-14 RX ORDER — DOCUSATE SODIUM 100 MG/1
100 CAPSULE, LIQUID FILLED ORAL
Status: DISCONTINUED | OUTPATIENT
Start: 2024-08-14 | End: 2024-08-16

## 2024-08-14 RX ORDER — NALBUPHINE HYDROCHLORIDE 10 MG/ML
2.5 INJECTION, SOLUTION INTRAMUSCULAR; INTRAVENOUS; SUBCUTANEOUS
Status: DISCONTINUED | OUTPATIENT
Start: 2024-08-14 | End: 2024-08-15

## 2024-08-14 RX ORDER — ACETAMINOPHEN 500 MG
1000 TABLET ORAL EVERY 6 HOURS PRN
Status: DISCONTINUED | OUTPATIENT
Start: 2024-08-14 | End: 2024-08-14 | Stop reason: HOSPADM

## 2024-08-14 RX ORDER — IBUPROFEN 600 MG/1
600 TABLET ORAL EVERY 6 HOURS
Status: DISCONTINUED | OUTPATIENT
Start: 2024-08-14 | End: 2024-08-16

## 2024-08-14 RX ORDER — METHYLERGONOVINE MALEATE 0.2 MG/ML
INJECTION INTRAVENOUS
Status: DISCONTINUED
Start: 2024-08-14 | End: 2024-08-14 | Stop reason: WASHOUT

## 2024-08-14 RX ADMIN — LIDOCAINE HYDROCHLORIDE 5 ML: 10 INJECTION, SOLUTION EPIDURAL; INFILTRATION; INTRACAUDAL; PERINEURAL at 15:18:00

## 2024-08-14 RX ADMIN — BUPIVACAINE HYDROCHLORIDE 5 ML: 2.5 INJECTION, SOLUTION EPIDURAL; INFILTRATION; INTRACAUDAL at 15:31:00

## 2024-08-14 RX ADMIN — LIDOCAINE HYDROCHLORIDE AND EPINEPHRINE 3 ML: 15; 5 INJECTION, SOLUTION EPIDURAL at 15:26:00

## 2024-08-14 NOTE — ANESTHESIA PREPROCEDURE EVALUATION
PRE-OP EVALUATION    Patient Name: Mine Patel    Admit Diagnosis: pregnancy  Pregnancy (HCC)    Pre-op Diagnosis: * No surgery found *        Anesthesia Procedure: LABOR ANALGESIA    * Surgery not found *    Pre-op vitals reviewed.  Temp: 97.7 °F (36.5 °C)  Pulse: 70  Resp: 16  BP: 109/57  SpO2: 100 %  Body mass index is 28.84 kg/m².    Current medications reviewed.  Hospital Medications:   miSOPROStol (Cytotec) 200 MCG tab        tranexamic acid in sodium chloride 0.7% (Cyklokapron) 1000 mg/100mL infusion premix        methylergonovine (Methergine) 0.2 mg/mL injection        lactated ringers infusion   Intravenous Continuous    dextrose in lactated ringers 5% infusion   Intravenous PRN    lactated ringers IV bolus 500 mL  500 mL Intravenous PRN    acetaminophen (Tylenol Extra Strength) tab 500 mg  500 mg Oral Q6H PRN    acetaminophen (Tylenol Extra Strength) tab 1,000 mg  1,000 mg Oral Q6H PRN    ibuprofen (Motrin) tab 600 mg  600 mg Oral Once PRN    ondansetron (Zofran) 4 MG/2ML injection 4 mg  4 mg Intravenous Q6H PRN    oxyTOCIN in sodium chloride 0.9% (Pitocin) 30 Units/500mL infusion premix  62.5-900 cris-units/min Intravenous Continuous    terbutaline (Brethine) 1 MG/ML injection 0.25 mg  0.25 mg Subcutaneous PRN    sodium citrate-citric acid (Bicitra) 500-334 MG/5ML oral solution 30 mL  30 mL Oral PRN    oxyTOCIN in sodium chloride 0.9% (Pitocin) 30 Units/500mL infusion premix  0.5-20 cris-units/min Intravenous Continuous    [COMPLETED] lactated ringers IV bolus 1,000 mL  1,000 mL Intravenous Once    fentaNYL-bupivacaine 2 mcg/mL-0.125% in sodium chloride 0.9% 100 mL EPIDURAL infusion premix  12 mL/hr Epidural Continuous    fentaNYL (Sublimaze) 50 mcg/mL injection 100 mcg  100 mcg Epidural Once    EPHEDrine (PF) 25 MG/5 ML injection 5 mg  5 mg Intravenous PRN    nalbuphine (Nubain) 10 mg/mL injection 2.5 mg  2.5 mg Intravenous Q15 Min PRN       Outpatient Medications:     Medications Prior to  Admission   Medication Sig Dispense Refill Last Dose    Ferrous Sulfate (IRON OR) Take by mouth.   8/13/2024    [id number] OR    8/13/2024       Allergies: Patient has no known allergies.      Anesthesia Evaluation        Anesthetic Complications  (-) history of anesthetic complications         GI/Hepatic/Renal    Negative GI/hepatic/renal ROS.                             Cardiovascular    Negative cardiovascular ROS.    Exercise tolerance: good     MET: >4                                           Endo/Other    Negative endo/other ROS.                              Pulmonary    Negative pulmonary ROS.                       Neuro/Psych    Negative neuro/psych ROS.                          Patient Active Problem List:     AMA (advanced maternal age) multigravida 35+, third trimester (HCC)     Family history of congenital heart disease     Excessive weight gain during pregnancy in third trimester ([facility])     Polyhydramnios, antepartum complication (HCC)     Excessive fetal growth affecting management of pregnancy in third trimester (HCC)     Pregnancy ([facility])           History reviewed. No pertinent surgical history.  Social History     Socioeconomic History    Marital status:    Tobacco Use    Smoking status: Never    Smokeless tobacco: Never   Vaping Use    Vaping status: Never Used   Substance and Sexual Activity    Alcohol use: Not Currently     Comment: socially    Drug use: Never    Sexual activity: Yes     Partners: Male   Other Topics Concern    Caffeine Concern No    Exercise No    Seat Belt No    Special Diet Yes     Comment: Vegetarian    Stress Concern No    Weight Concern No     History   Drug Use Unknown     Available pre-op labs reviewed.  Lab Results   Component Value Date    WBC 10.1 08/14/2024    RBC 3.61 (L) 08/14/2024    HGB 11.6 (L) 08/14/2024    HCT 32.7 (L) 08/14/2024    MCV 90.6 08/14/2024    MCH 32.1 08/14/2024    MCHC 35.5 08/14/2024    RDW 13.5 08/14/2024    .0  08/14/2024               Airway      Mallampati: II  Mouth opening: >3 FB  TM distance: 4 - 6 cm  Neck ROM: full Cardiovascular    Cardiovascular exam normal.  Rhythm: regular  Rate: normal     Dental             Pulmonary    Pulmonary exam normal.                 Other findings              ASA: 2   Plan: epidural           Comment: Benefits of epidural analgesia were discussed with Mine Patel. Realistic expectations were also discussed including necessity for additional dosing, or replacement of epidural, nausea/vomiting, pruritus, as well as other serious but rare complications including PPDH, infection, epidural hematoma, nerve/cord injury. Patient verbalizes understanding of expectations and risk. All questions were answered.     Plan/risks discussed with: patient and spouse                Present on Admission:  **None**

## 2024-08-14 NOTE — PLAN OF CARE
Problem: BIRTH - VAGINAL/ SECTION  Goal: Fetal and maternal status remain reassuring during the birth process  Description: INTERVENTIONS:  - Monitor vital signs  - Monitor fetal heart rate  - Monitor uterine activity  - Monitor labor progression (vaginal delivery)  - DVT prophylaxis (C/S delivery)  - Surgical antibiotic prophylaxis (C/S delivery)  Outcome: Progressing     Problem: PAIN - ADULT  Goal: Verbalizes/displays adequate comfort level or patient's stated pain goal  Description: INTERVENTIONS:  - Encourage pt to monitor pain and request assistance  - Assess pain using appropriate pain scale  - Administer analgesics based on type and severity of pain and evaluate response  - Implement non-pharmacological measures as appropriate and evaluate response  - Consider cultural and social influences on pain and pain management  - Manage/alleviate anxiety  - Utilize distraction and/or relaxation techniques  - Monitor for opioid side effects  - Notify MD/LIP if interventions unsuccessful or patient reports new pain  - Anticipate increased pain with activity and pre-medicate as appropriate  Outcome: Progressing     Problem: ANXIETY  Goal: Will report anxiety at manageable levels  Description: INTERVENTIONS:  - Administer medication as ordered  - Teach and rehearse alternative coping skills  - Provide emotional support with 1:1 interaction with staff  Outcome: Progressing     Problem: Patient/Family Goals  Goal: Patient/Family Long Term Goal  Description: Patient's Long Term Goal: safe delivery of infant    - See additional Care Plan goals for specific interventions  Outcome: Progressing  Goal: Patient/Family Short Term Goal  Description: Patient's Short Term Goal: adequate pain management      - See additional Care Plan goals for specific interventions  Outcome: Progressing

## 2024-08-14 NOTE — ANESTHESIA PROCEDURE NOTES
Labor Analgesia    Date/Time: 8/14/2024 3:12 PM    Performed by: Chau Frederick MD  Authorized by: Chau Frederick MD      General Information and Staff    Start Time:  8/14/2024 3:12 PM  End Time:  8/14/2024 3:26 PM  Anesthesiologist:  Chau Frederick MD  Performed by:  Anesthesiologist  Patient Location:  OB  Site Identification: surface landmarks    Reason for Block: labor epidural    Preanesthetic Checklist: patient identified, IV checked, risks and benefits discussed, monitors and equipment checked, pre-op evaluation, timeout performed, IV bolus, anesthesia consent and sterile technique used      Procedure Details    Patient Position:  Sitting  Prep: ChloraPrep    Monitoring:  Heart rate and continuous pulse ox  Approach:  Midline    Epidural Needle    Injection Technique:  NILAM air  Needle Type:  Tuohy  Needle Gauge:  17 G  Needle Length:  3.375 in  Needle Insertion Depth:  4.5  Location:  L3-4    Spinal Needle    Needle Type:  Pencil-tip (Intentional dural tap with 27G)  Needle Gauge:  27 G    Catheter    Catheter Type:  End hole  Catheter Size:  19 G  Test Dose:  Negative    Assessment    Sensory Level:  T4  Events: other event      Additional Comments       In the labor room, patient in sitting position, assisted by RN; hands sterilized with alcohol gel, sterile gloves, mask, and hat worn, sterile prep with the chloraprep, sterile drape applied. With the 17G Touhy needle, at approximate L 3-4 level,  NILAM-to-air obtained at 4.5 cm on the first pass, epidural catheter threaded easily, upon removal of the needle, catheter migrated out at the position where it was at risk at being dislodged. Catheter removed and procedure repeated at the same level. Unable to appreciate the clear NILAM at 4.5cm, 27G spinal needle was introduced and clear CSF returned. Catheter was re-inserted and secured to patient back.  Patient denied any paraesthesias, no mitzy blood or CSF aspirated via catheter. 3ml Lidocaine 1.5% with epi test  dose administered- negative test dose. Epidural dosed with Bupivacaine 0.25% without significant hypotension immediately; Patient tolerated procedure well.     However shortly later patient unable to move her legs and her anesthetic level was too high. Unable to aspirate any CSF via catheter, unclear if catheter could have been in the subdural area.   Catheter to be removed and new one started once motor function returns to her lower extremity.     Patient evaluated by me at around 19:00. Appears that previous epidural catheter was either intrathecal ( not used after the positive test dose ) and needed to be replaced. New epidural catheter replaced at 19:15 by Dr Coyle

## 2024-08-15 LAB
BASOPHILS # BLD AUTO: 0.08 X10(3) UL (ref 0–0.2)
BASOPHILS NFR BLD AUTO: 0.5 %
EOSINOPHIL # BLD AUTO: 0.15 X10(3) UL (ref 0–0.7)
EOSINOPHIL NFR BLD AUTO: 1 %
ERYTHROCYTE [DISTWIDTH] IN BLOOD BY AUTOMATED COUNT: 13.5 %
HCT VFR BLD AUTO: 32.4 %
HGB BLD-MCNC: 11.2 G/DL
IMM GRANULOCYTES # BLD AUTO: 0.26 X10(3) UL (ref 0–1)
IMM GRANULOCYTES NFR BLD: 1.7 %
LYMPHOCYTES # BLD AUTO: 1.72 X10(3) UL (ref 1–4)
LYMPHOCYTES NFR BLD AUTO: 11.3 %
MCH RBC QN AUTO: 31.8 PG (ref 26–34)
MCHC RBC AUTO-ENTMCNC: 34.6 G/DL (ref 31–37)
MCV RBC AUTO: 92 FL
MONOCYTES # BLD AUTO: 1.59 X10(3) UL (ref 0.1–1)
MONOCYTES NFR BLD AUTO: 10.5 %
NEUTROPHILS # BLD AUTO: 11.37 X10 (3) UL (ref 1.5–7.7)
NEUTROPHILS # BLD AUTO: 11.37 X10(3) UL (ref 1.5–7.7)
NEUTROPHILS NFR BLD AUTO: 75 %
PLATELET # BLD AUTO: 182 10(3)UL (ref 150–450)
RBC # BLD AUTO: 3.52 X10(6)UL
WBC # BLD AUTO: 15.2 X10(3) UL (ref 4–11)

## 2024-08-15 RX ORDER — ACETAMINOPHEN 500 MG
1000 TABLET ORAL EVERY 6 HOURS PRN
Status: SHIPPED | COMMUNITY
Start: 2024-08-15

## 2024-08-15 RX ORDER — IBUPROFEN 600 MG/1
600 TABLET ORAL EVERY 6 HOURS PRN
Qty: 30 TABLET | Refills: 0 | Status: SHIPPED | OUTPATIENT
Start: 2024-08-15

## 2024-08-15 NOTE — PROGRESS NOTES
Pt up to BR with assistance from this RN. Gait steady. Pt voids large amt without difficulty. Vita-bottle given. Ice and tucks to perineum. Pt up to WC without incident.

## 2024-08-15 NOTE — ANESTHESIA PROCEDURE NOTES
Labor Analgesia    Date/Time: 8/14/2024 7:15 PM    Performed by: Arturo Coyle MD  Authorized by: Arturo Coyle MD      General Information and Staff    Start Time:  8/14/2024 7:15 PM  End Time:  8/14/2024 7:24 PM  Anesthesiologist:  Arturo Coyle MD  Performed by:  Anesthesiologist  Patient Location:  OB  Site Identification: surface landmarks    Reason for Block: labor epidural    Preanesthetic Checklist: patient identified, IV checked, risks and benefits discussed, monitors and equipment checked, pre-op evaluation, timeout performed, IV bolus, anesthesia consent and sterile technique used      Procedure Details    Patient Position:  Sitting  Prep: ChloraPrep    Monitoring:  Heart rate and continuous pulse ox  Approach:  Midline    Epidural Needle    Injection Technique:   Needle Type:  Tuohy  Needle Gauge:  17 G  Needle Length:  3.375 in  Needle Insertion Depth:  5  Location:  L3-4    Spinal Needle      Catheter    Catheter Type:  End hole  Catheter Size:  19 G  Test Dose:  Negative    Assessment    Sensory Level:  T8    Additional Comments       This is a new catheter replaced by me.

## 2024-08-15 NOTE — L&D DELIVERY NOTE
Ashwin Patel [[id number]]      Labor Events     labor?: No   steroids?: None  Antibiotics received during labor?: No  Rupture date/time: 2024 1208     Rupture type: AROM  Fluid color: Clear, Pink  Labor type: Induced Onset of Labor  Induction: Oxytocin, AROM  Indications for induction: Other - comment  Induction comment: 40.1 wks, Polyhydramnios,   Intrapartum & labor complications: Polyhydramnios       Labor Length    1st stage: 10h 35m  2nd stage: 0h 28m  3rd stage: 0h 02m       Labor Event Times    Labor onset date/time: 2024  Dilation complete date/time: 2024  Start pushing date/time: 2024       [place] Presentation    Presentation: Vertex  Position: Right Occiput Anterior       Operative Delivery    Operative Vaginal Delivery: No                Shoulder Dystocia    Shoulder Dystocia: No       Anesthesia    Method: Epidural               Delivery      Head delivery date/time: 2024 20:28:43   Delivery date/time:  24 20:28:55   Delivery type: Normal spontaneous vaginal delivery    Details:     Delivery location: delivery room       Delivery Providers    Delivering Clinician: Candi Arriaga DO   Delivery personnel:  Provider Role   Pricila Keita, JERRY Baby Nurse   Kari Alexis, RN Delivery Nurse             Cord    Vessels: 3 Vessels  Complications: None  Timed cord clamping: Yes  Time in sec: 45  Cord blood disposition: to lab  Gases sent?: No       Resuscitation    Method: None        Measurements      Weight: 3760 g 8 lb 4.6 oz Length: 49.5 cm     Head circum.: 35.5 cm Chest circum.: 34 cm      Abdominal circum.: 33 cm           Placenta    Date/time: 2024  Removal: Spontaneous  Appearance: Intact  Disposition: held for future pathology       Apgars    Living status: Living   Apgar Scoring Key:    0 1 2    Skin color Blue or pale Acrocyanotic Completely pink    Heart rate Absent <100 bpm >100 bpm    Reflex irritability  No response Grimace Cry or active withdrawal    Muscle tone Limp Some flexion Active motion    Respiratory effort Absent Weak cry; hypoventilation Good, crying              1 Minute:  5 Minute:  10 Minute:  15 Minute:  20 Minute:      Skin color: 0  1       Heart rate: 2  2       Reflex irritablity: 2  2       Muscle tone: 2  2       Respiratory effort: 2  2       Total: 8  9          Apgars assigned by: CLOVER WORKMAN RN  [place] disposition: with mother       Skin to Skin    Skin to skin initiated date/time: 2024  Skin to skin with: Mother       Vaginal Count    Initial count RN: Ebony Rockwell RN  Initial count Tech: Erin Ruiz    Initial counts 11   0    Final counts 11   1    Final count RN: Kari Alexis RN  Final count MD: Candi Arriaga DO       Lacerations    Episiotomy: None  Perineal lacerations: 2nd Repaired?: Yes     Vaginal laceration?: No      Cervical laceration?: No    Clitoral laceration?: No    Quantitative blood loss (mL): 140              39 y.o.  with  male infant, APGARs 8/9, weight 8lb5oz. Body and shoulders easily delivered. Cord lamped x2 and cut after 30 seconds. Placenta delivered spontaneously, intact. 2nd degree perineal laceration repaired with 2-0 chromic. Good hemostasis  cc

## 2024-08-15 NOTE — PLAN OF CARE
Problem: BIRTH - VAGINAL/ SECTION  Goal: Fetal and maternal status remain reassuring during the birth process  Description: INTERVENTIONS:  - Monitor vital signs  - Monitor fetal heart rate  - Monitor uterine activity  - Monitor labor progression (vaginal delivery)  - DVT prophylaxis (C/S delivery)  - Surgical antibiotic prophylaxis (C/S delivery)  Outcome: Adequate for Discharge     Problem: PAIN - ADULT  Goal: Verbalizes/displays adequate comfort level or patient's stated pain goal  Description: INTERVENTIONS:  - Encourage pt to monitor pain and request assistance  - Assess pain using appropriate pain scale  - Administer analgesics based on type and severity of pain and evaluate response  - Implement non-pharmacological measures as appropriate and evaluate response  - Consider cultural and social influences on pain and pain management  - Manage/alleviate anxiety  - Utilize distraction and/or relaxation techniques  - Monitor for opioid side effects  - Notify MD/LIP if interventions unsuccessful or patient reports new pain  - Anticipate increased pain with activity and pre-medicate as appropriate  Outcome: Adequate for Discharge     Problem: ANXIETY  Goal: Will report anxiety at manageable levels  Description: INTERVENTIONS:  - Administer medication as ordered  - Teach and rehearse alternative coping skills  - Provide emotional support with 1:1 interaction with staff  Outcome: Adequate for Discharge     Problem: Patient/Family Goals  Goal: Patient/Family Long Term Goal  Description: Patient's Long Term Goal:    Interventions:  -   - See additional Care Plan goals for specific interventions  Outcome: Adequate for Discharge  Goal: Patient/Family Short Term Goal  Description: Patient's Short Term Goal:     Interventions:   -   - See additional Care Plan goals for specific interventions  Outcome: Adequate for Discharge

## 2024-08-15 NOTE — PROGRESS NOTES
Labor Analgesia Follow Up Note    Patient underwent epidural anesthesia for labor analgesia,    Placenta Date/Time: 8/14/2024  8:31 PM     Delivery Date/Time:: 8/14/2024   8:28 PM     /72 (BP Location: Left arm)   Pulse 60   Temp 98.4 °F (36.9 °C) (Oral)   Resp 18   Ht 1.626 m (5' 4\")   Wt 76.2 kg (168 lb)   LMP 11/07/2023 (Exact Date)   SpO2 100%   Breastfeeding Yes   BMI 28.84 kg/m²     Assessment:  Patient seen and  c/o of mild positional headache.  Significantly improved with hydration and tylenol.  Pt was counseled on PDPH and its management.  At this point agreed up plan was to continue with conservative management and reevaluate the need for an epidural blood patch tomorrow.    Thank you for asking us to participate in the care of your patient.

## 2024-08-15 NOTE — H&P
[facility]  History & Physical    Mine Patel Patient Status:  Inpatient    1985 MRN [id number]   Location [facility] LABOR & DELIVERY Attending Candi Arriaga,    Hosp Day # 0 PCP None Pcp     SUBJECTIVE:    Mine Patel is a 39 year old  female with EDC 24 at 40 and 1/7 weeks gestation who is being admitted for induction of labor.  Her current obstetrical history is significant for advanced maternal age.  Patient reports no complaints .   Fetal Movement: normal.     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 Child with cardiac defect.       Current - AMA, Fhx congenital heart defect in patient's 1st child, excessive weight gain, LGA fetus, mild polyhydramnios     Past Medical History:   Past Medical History:    Abnormal uterine bleeding    Family history of congenital heart defect    Patient's first child born with hole in heart - no surgery    Polyhydramnios (HCC)    2024 - 39w6d - EFW 4116g (9lb1oz) ± 601g. EFW 92%, AC 96%. NARDA 26.2 cm    Wears glasses     Past Social History: History reviewed. No pertinent surgical history.  Family History:   Family History   Problem Relation Age of Onset    Hyperlipidemia Maternal Grandfather     Ovarian Cancer Paternal Grandmother     Birth Defects Son         hole in heart, no surgery     Social History:   Social History     Tobacco Use    Smoking status: Never    Smokeless tobacco: Never   Substance Use Topics    Alcohol use: Not  Currently     Comment: socially       Home Meds:   Medications Prior to Admission   Medication Sig Dispense Refill Last Dose    Ferrous Sulfate (IRON OR) Take by mouth.   8/13/2024    [id number] OR    8/13/2024     Allergies: No Known Allergies    OBJECTIVE:    Temp:  [97.4 °F (36.3 °C)-98.4 °F (36.9 °C)] 97.5 °F (36.4 °C)  Pulse:  [] 89  Resp:  [16] 16  BP: ()/(41-85) 115/63  SpO2:  [98 %-100 %] 100 %    Lungs:   clear to auscultation bilaterally   Heart:   regular rate and rhythm, S1, S2 normal, no murmur, click, rub or gallop   Abdomen: FHT present   Fetal Surveillance:  145 BPM   Fetal heart variability: moderate      Cervix: dilation 3 cm     Lab Review:  O, Rh+, Rubella-immune, Hepatitis B surface antigen non-reactive, GBS negative  Recent Labs   Lab 08/14/24  0840   RBC 3.61*   HGB 11.6*   HCT 32.7*   MCV 90.6   MCH 32.1   MCHC 35.5   RDW 13.5   NEPRELIM 6.84   WBC 10.1   .0            ASSESSMENT:    40 and 1/7 weeks gestation.  Not in labor.  Obstetrical history significant for advanced maternal age.    PLAN:    Risks, benefits, alternatives and possible complications have been discussed in detail with the patient.  Pre-admission, admission, and post admission procedures and expectations were discussed in detail.  All questions answered, all appropriate consents will be signed at the Hospital. Admission is planned for today.   Intervention: amniorhexis and IV Pitocin induction.    Candi Arriaga DO  8/14/2024  9:01 PM

## 2024-08-15 NOTE — PROGRESS NOTES
Pt is a 39 year old female admitted to 114/114-A.     Chief Complaint   Patient presents with    Scheduled Induction      Pt is  40w1d intra-uterine pregnancy.  History obtained, consents signed. Oriented to room, staff, and plan of care.     EFM tested and applied. Abdomen soft and non-tender.

## 2024-08-15 NOTE — PROGRESS NOTES
[facility]  Post-Partum  Progress Note    Mine Patel Patient Status:  Inpatient    1985 MRN [id number]   Location [facility] 2SW-J Attending Candi Arriaga,    Hosp Day # 1 PCP None Pcp     SUBJECTIVE:   Mine Patel is PPD# 1 s/p . Her pain is  well controlled. She is  ambulating,  voiding spontaneously,  tolerating a general diet. Her vaginal bleeding is appropriate.     OBJECTIVE:    Vital signs in last 24 hours:  Temp:  [97.4 °F (36.3 °C)-98.4 °F (36.9 °C)] 98.4 °F (36.9 °C)  Pulse:  [] 60  Resp:  [16-18] 18  BP: ()/(41-85) 111/72  SpO2:  [98 %-100 %] 100 %    Input/Output:    Intake/Output Summary (Last 24 hours) at 8/15/2024 0959  Last data filed at 8/15/2024 0035  Gross per 24 hour   Intake 4138.25 ml   Output 3240 ml   Net 898.25 ml       Gen: alert, normal affect, no apparent distress   CV: RRR, no murmurs/rubs/gallops; no edema bilaterally   Resp: lungs CTA bilaterally, no rales/rhonchi/wheezes; normal effort   Abd: soft, nondistended, appropriately tender, normal active bowel sounds; fundus firm and below umbilicus     Data Reviewed:  Recent Labs   Lab 24  0840 08/15/24  0744   RBC 3.61* 3.52*   HGB 11.6* 11.2*   HCT 32.7* 32.4*   MCV 90.6 92.0   MCH 32.1 31.8   MCHC 35.5 34.6   RDW 13.5 13.5   NEPRELIM 6.84 11.37*   WBC 10.1 15.2*   .0 182.0     Rubella IgG   Date Value Ref Range Status   04/10/2024 Positive Positive Final     Comment:     Rubella IgG Result Interpretation    Negative   <5.0 IU/mL  Equivocal  >= 5.0 - 9.9 IU/mL  Positive:  >= 10.0 IU/mL               ASSESSMENT/PLAN:  39 year old  PPD#1 s/p . doing well. Pregnancy complicated by AMA.    #Postpartum care:   Encourage ambulation   Pain control: NSAIDS, tylenol  Regular diet   Hg 11.2  Rubella immune  Rh positive  Infant: male  Disposition: anticipate discharge home on PPD#2.       Jeffy Miller MD  [facility]  8/15/2024 9:59 AM

## 2024-08-15 NOTE — PLAN OF CARE
Problem: POSTPARTUM  Goal: Establishment of adequate milk supply with medication/procedure interruptions  Description: INTERVENTIONS:  - Review techniques for milk expression (breast pumping).   - Provide pumping equipment/supplies, instructions, and assistance until it is safe to breastfeed infant.  Outcome: Progressing     Problem: POSTPARTUM  Goal: Experiences normal breast weaning course  Description: INTERVENTIONS:  - Assess for and manage engorgement.  - Instruct on breast care.  - Provide comfort measures.  Outcome: Progressing     Problem: POSTPARTUM  Goal: Appropriate maternal -  bonding  Description: INTERVENTIONS:  - Assess caregiver- interactions.  - Assess caregiver's emotional status and coping mechanisms.  - Encourage caregiver to participate in  daily care.  - Assess support systems available to mother/family.  - Provide /case management support as needed.  Outcome: Progressing

## 2024-08-15 NOTE — PROGRESS NOTES
Patient admitted to room via wheel chair. Oriented to room. Safety precautions initiated. Call light within reach.Teaching and care plan initiated.  Patient instructed to call staff for assistance to go to the bathroom. Patient verbalizes understanding. ID bands matched and confirmed.

## 2024-08-15 NOTE — ANESTHESIA PROCEDURE NOTES
Labor Analgesia    Date/Time: 8/14/2024 3:12 PM    Performed by: Arturo Coyle MD  Authorized by: Arturo Coyle MD      General Information and Staff    Start Time:  8/14/2024 3:12 PM  End Time:  8/14/2024 3:26 PM  Anesthesiologist:  Arturo Coyle MD  Performed by:  Anesthesiologist  Patient Location:  OB  Site Identification: surface landmarks    Reason for Block: labor epidural    Preanesthetic Checklist: patient identified, IV checked, risks and benefits discussed, monitors and equipment checked, pre-op evaluation, timeout performed, IV bolus, anesthesia consent and sterile technique used      Procedure Details    Patient Position:  Sitting  Prep: ChloraPrep    Monitoring:  Heart rate and continuous pulse ox  Approach:  Midline    Epidural Needle    Injection Technique:  NILAM saline  Needle Type:  Tuohy  Needle Gauge:  17 G  Needle Length:  3.375 in  Needle Insertion Depth:  5  Location:  L3-4    Spinal Needle      Catheter    Catheter Type:  End hole  Catheter Size:  19 G  Test Dose:  Negative    Assessment    Sensory Level:  T8    Additional Comments       Patient had an epidural placed by Dr Frederick earlier today and a test dose around 16;00 indicated a possible inytrathecal placement with test dose being positive for intrathecal placement. Catheter was never used and I was asked to evaluate the patient and if deemed necessary replace it. Patient at the time of my assessment was having substantial pain from contractions and a new epidural catheter was placed. ( See a new note)

## 2024-08-16 VITALS
TEMPERATURE: 98 F | WEIGHT: 168 LBS | SYSTOLIC BLOOD PRESSURE: 113 MMHG | DIASTOLIC BLOOD PRESSURE: 75 MMHG | HEART RATE: 74 BPM | BODY MASS INDEX: 28.68 KG/M2 | RESPIRATION RATE: 18 BRPM | HEIGHT: 64 IN | OXYGEN SATURATION: 100 %

## 2024-08-16 NOTE — PROGRESS NOTES
Labor Analgesia Follow Up Note    Patient underwent epidural anesthesia for labor analgesia,    Placenta Date/Time: 8/14/2024  8:31 PM     Delivery Date/Time:: 8/14/2024   8:28 PM     /75 (BP Location: Left arm)   Pulse 74   Temp 98.2 °F (36.8 °C) (Oral)   Resp 18   Ht 1.626 m (5' 4\")   Wt 76.2 kg (168 lb)   LMP 11/07/2023 (Exact Date)   SpO2 100%   Breastfeeding Yes   BMI 28.84 kg/m²     Assessment:  Patient seen and  Pt states moving around well and denies positional headache.  Instructed to contact anesthesia dept or go to ER if significant symptoms present    Thank you for asking us to participate in the care of your patient.

## 2024-08-16 NOTE — DISCHARGE INSTRUCTIONS
Post Vaginal Delivery Home Care Instructions     Mine-  We hope you were pleased with your care at [facility].  We wish you the best outcome and overall experience with the delivery of your baby.  These instructions will help to minimize pain, limit the risk for an infection, and improve the likelihood of a successful recovery.    What to Expect:  Abdominal cramping after delivery especially if you are breastfeeding   Vaginal bleeding for about 4-6 weeks that may be followed by a yellow or white discharge for a few more weeks.  Your period will resume in approximately 6-8 weeks, unless you are breastfeeding    If you are bottle feeding, you may notice breast engorgement in about 3 days.  Your breast may be sore and hard. Please wear a tight fitted bra or sports bra for 24-36 hours to help prevent your breast from producing milk, and use ice packs to relive any discomfort  If you are breastfeeding, nipple dryness is very common the first few days    Constipation is common after having a baby, please increase fluid and fiber in your diet     Over-The-Counter Medication  Non-prescription anti-inflammatory medications can also help to ease the pain.  You may take Aleve, Tylenol or Ibuprofen   Colace or Metamucil for Constipation  Lanolin for dry nipples  Tucks, Witch Hazel and Epifoam for Episiotomy discomfort   Drink a full glass of water with oral medication and take as directed    Wound Care  The following instructions will promote proper healing and help to prevent infection  Episiotomy Care: Sitz Bath for 15mins, 2-3 times a day    Bathing/Showers  You may resume showers  No baths, swimming, hot tubs until your post-partum visit    Home Medication  Resume your home medications as instructed    Diet  Resume your normal diet    Activity  Refrain from vaginal intercourse, vaginal suppositories, tampon use or douches until after your post-partum visit  No exercising for 4 weeks  You may climb stairs minimally  for the first week    Do not do heavy housework for at least 2-3 weeks    Return to Work or School  You may return to work in approximately 6 weeks  Contact your obstetrician’s office, if you need a medical release form/letter    Driving  Avoid driving if you are taking narcotics for pain relief    Follow-up Appointment with Your Obstetrician  Call your obstetrician’s office today for an appointment in 4-6 weeks    Verify your appointment date, day, time, and location  At your first post-partum office visit, your progress will be evaluated, findings reviewed, and any additional concerns and instructions will be discussed    Questions or Concerns  Call your obstetrician’s office if you experience the following:  Severe pain not controlled by pain medication  Foul smelling vaginal discharge  Heavy bleeding  Shortness of breath  Fever  Redness, increased swelling or drainage from your incision  Crying and periods of sadness that prevents you from caring for yourself and your baby  Burning sensation during urination or inability to urinate  Swelling, redness or abnormal warmth to your leg/calf  Please call . If your call is made after office hours, a physician will be available to help you.  There is always a provider covering our patients    Mine  Thank you for coming to [facility] to start your new family.  The nurses and the anesthesiologists try very hard to make sure you receive the best care possible.  Your trust in them as well as us is greatly appreciated.    Thanks so much,   The Providers of [facility]    While you were here we were administering these following medications to you:     Ibuprofen 600 mg every 6 hours on the following schedule: 6 am-12 pm-6 pm-12 am    Tylenol 500-1000 mg every 6 hours as needed to alternate with ibuprofen.    Colace 100 mg twice daily at 6 am and 6 pm

## 2024-08-16 NOTE — PROGRESS NOTES
NURSING DISCHARGE NOTE    Discharge instructions reviewed with patient. Patient encouraged to ask questions and discharge paperwork provided. Teal band explained and given to patient. Patient encouraged to make follow up appointment with OB/GYN for 6 weeks postpartum.

## 2024-08-16 NOTE — DISCHARGE SUMMARY
[facility]  Discharge Summary    Mine Patel Patient Status:  inpatient    1985 MRN [id number]   Location 219/2199-A Attending [facility]   Hosp Day # 1 PCP None Pcp     Date of Admission: 2024    Date of Discharge: 24    Admitting Diagnosis: pregnancy  Pregnancy (HCC)    Discharge Diagnosis:  s/p     Hospital Course: The patient is a 39 year old female now  who was admitted on  at 40wk1d for IOL.  Pitocin was initiated.  Epidural was placed for pain management per patient request. The patient progressed to complete. S/p  on . Please refer to delivery note for further details. She was meeting all postpartum milestones on PPD#1. She is to be discharged on PPD#2 as long as she continues to do well.     Procedures:     Complications: none    Discharge Condition: Stable    Discharge Medications: Current Discharge Medication List       Medication List        START taking these medications      acetaminophen 500 MG Tabs  Commonly known as: Tylenol Extra Strength     ibuprofen 600 MG Tabs  Commonly known as: Motrin  Take 1 tablet (600 mg total) by mouth every 6 (six) hours as needed for Pain.            CONTINUE taking these medications      [id number] OR            STOP taking these medications      IRON OR               Where to Get Your Medications        These medications were sent to [id number] - [place], [place] - [address] AT [facility] [address] , [phone], [phone] [address] [address]      Phone: [phone]   ibuprofen 600 MG Tabs           Follow up Visits: Follow-up with [facility] in 6 weeks      Jeffy Miller MD  [facility] - [facility]        Note to patient and family   The 21st Century Cures Act makes medical notes available to patients in the interest of transparency.  However, please be advised that this is a medical document.  It is intended as [id number] communication.  It is written and  medical language may contain abbreviations or verbiage that are technical and unfamiliar.  It may appear blunt or direct.  Medical documents are intended to carry relevant information, facts as evident, and the clinical opinion of the practitioner.

## 2024-08-17 ENCOUNTER — MOBILE ENCOUNTER (OUTPATIENT)
Dept: OBGYN CLINIC | Facility: CLINIC | Age: 39
End: 2024-08-17

## 2024-08-18 ENCOUNTER — TELEPHONE (OUTPATIENT)
Dept: ANESTHESIOLOGY | Facility: HOSPITAL | Age: 39
End: 2024-08-18

## 2024-08-18 NOTE — PROGRESS NOTES
TELEPHONE ENCOUNTER    Mine Patel is 39 year old F s/p  on , now with positional headache. She's had epidural for her delivery that was possibly intrathecal (removed and replaced). She was discharged 2 days later without any issues. However over past 2 days developed positional occipital headache, neck pain. She's able to stand for no more than 30 minutes before symptoms begin, then lying down helps resolve. She denies any neurologic deficits otherwise. No photophobia, no N/V. She's been managing her symptoms with tylenol/ibuprofen, hydration, and caffeine. She has had some vague chest pain and upper back pain yesterday following a meal but that has resolved since. 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 We discussed natural course of the condition. I discussed options like conservative management until CSF regenerates on its own, or epidural blood patch in the ED if her symptoms are ADL-limiting.   I explained that epidural blood patch works most of the time, however small chance symptoms may recur. She feels that she can manage her symptoms on her own right now. I instructed her to come to the ED for the evaluation for blood patch if her symptoms become worse or ADL-limiting. Patient in agreement.     Chau Frederick MD  Anesthesiologist

## 2024-08-18 NOTE — PROGRESS NOTES
Returned patient's page.  Patient reports upper chest pain and back pain.  Patient states pain does improve when she lies supine.  Patient reports pain is manageable when she does take Tylenol.  Patient currently on ibuprofen every 6 hours for postpartum discomfort.  However, she does continue to experience pain.  Patient denies fever, chills, and shortness of breath.  Patient had an epidural.  Reports had difficulties with her initial epidural.  She was advised by the anesthesiologist to contact either the anesthesiologist or her OB/GYN if she experiences back pain or headache.  Patient attempted to contact the anesthesiologist but unable to contact physician at this time.  Therefore, patient contacted her OB/GYN.  Advised patient to report to the emergency room for further evaluation as she continues to have chest pain and back pain.  Patient expressed that she was hesitant because concerned about receiving a subsequent procedure that may or may not provide symptom relief.  I discussed with patient that her symptoms are concerning and that the patient was concerned enough to contact the on-call OB/GYN physician.  Therefore, I would advise her to report to the emergency room for further evaluation.  I provided precautions.  Patient verbalized understanding.  Patient advised to report to [facility] in [place].  All questions and concerns were addressed.    Cecily Dukes MD   [facility] - OBGYN

## 2024-08-19 ENCOUNTER — TELEPHONE (OUTPATIENT)
Dept: OBGYN UNIT | Facility: HOSPITAL | Age: 39
End: 2024-08-19

## 2024-08-19 ENCOUNTER — TELEPHONE (OUTPATIENT)
Facility: CLINIC | Age: 39
End: 2024-08-19

## 2024-08-19 NOTE — TELEPHONE ENCOUNTER
Breast Pump order was received from [facility].  Order form was placed in Dr. Candi Arriaga's bin for signature.

## 2024-08-19 NOTE — PROGRESS NOTES
Unable to reach mom at this time.  Left message to check [facility] for additional information and to contact MDs with any urgent questions and concerns.

## 2024-08-21 ENCOUNTER — ANESTHESIA (OUTPATIENT)
Dept: EMERGENCY DEPT | Facility: HOSPITAL | Age: 39
End: 2024-08-21
Payer: COMMERCIAL

## 2024-08-21 ENCOUNTER — TELEPHONE (OUTPATIENT)
Facility: CLINIC | Age: 39
End: 2024-08-21

## 2024-08-21 ENCOUNTER — HOSPITAL ENCOUNTER (EMERGENCY)
Facility: HOSPITAL | Age: 39
Discharge: HOME | End: 2024-08-21
Attending: EMERGENCY MEDICINE
Payer: COMMERCIAL

## 2024-08-21 ENCOUNTER — ANESTHESIA EVENT (OUTPATIENT)
Dept: EMERGENCY DEPT | Facility: HOSPITAL | Age: 39
End: 2024-08-21
Payer: COMMERCIAL

## 2024-08-21 VITALS
DIASTOLIC BLOOD PRESSURE: 86 MMHG | OXYGEN SATURATION: 98 % | HEIGHT: 64 IN | SYSTOLIC BLOOD PRESSURE: 112 MMHG | WEIGHT: 150 LBS | HEART RATE: 60 BPM | BODY MASS INDEX: 25.61 KG/M2 | TEMPERATURE: 98 F | RESPIRATION RATE: 15 BRPM

## 2024-08-21 DIAGNOSIS — G97.1 SPINAL HEADACHE: Primary | ICD-10-CM

## 2024-08-21 PROCEDURE — 99283 EMERGENCY DEPT VISIT LOW MDM: CPT

## 2024-08-21 PROCEDURE — 62273 INJECT EPIDURAL PATCH: CPT

## 2024-08-21 PROCEDURE — 99284 EMERGENCY DEPT VISIT MOD MDM: CPT

## 2024-08-21 PROCEDURE — 62273 INJECT EPIDURAL PATCH: CPT | Performed by: ANESTHESIOLOGY

## 2024-08-21 NOTE — ANESTHESIA PROCEDURE NOTES
Blood Patch    Date/Time: 8/21/2024 4:50 PM    Performed by: Arturo Coyle MD  Authorized by: Arturo Coyle MD    General Information and Staff    Start Time:  8/21/2024 4:50 PM  End Time:  8/21/2024 5:00 PM  Anesthesiologist:  Arturo Coyle MD  Performed by:  Anesthesiologist  Patient Location:  ED  Site Identification: surface landmarks    Preanesthetic Checklist: 2 patient identifiers, IV checked, site marked, risks and benefits discussed, surgical consent, monitors and equipment checked, pre-op evaluation, timeout performed, anesthesia consent and sterile technique used      Procedure Details    Location of Venous Blood Draw:  Arm  Volume of Blood Injected:  20 mL  Patient Position:  Sitting  Prep: Chloraprep    Monitoring:  EKG, continuous pulse oximetry and blood pressure monitoring  Approach:  Midline  Location:  L3-4  Injection Technique:  NILAM air    Needle and Epidural Catheter Details    Injection Method:  Touhy needle  Needle Gauge:  17  Needle Length (cm):  8.5    Assessment    Events: well tolerated      Medications   8/21/2024 4:50 PM      Additional Comments

## 2024-08-21 NOTE — ED PROVIDER NOTES
Patient Seen in: [facility] Emergency Department      History     Chief Complaint   Patient presents with    Headache    Postop/Procedure Problem     Stated Complaint: Headache, back pain, 1 week post partum, here for blood patch    Subjective:   HPI    39-year-old female here for headache patient has a headache and posterior neck pain when she is upright this has been ongoing after having epidural during a vaginal delivery about 1 week ago.  She describes her head to the epidural twice.  Any she has been taking Tylenol with some relief denies any fever chills vomiting pain goes away when she is flat.  She has tried fluids caffeine without improvement.    Objective:   Past Medical History:    Abnormal uterine bleeding    Family history of congenital heart defect    Patient's first child born with hole in heart - no surgery    Polyhydramnios (HCC)    8/12/2024 - 39w6d - EFW 4116g (9lb1oz) ± 601g. EFW 92%, AC 96%. NARDA 26.2 cm    Wears glasses              History reviewed. No pertinent surgical history.             Social History     Socioeconomic History    Marital status:    Tobacco Use    Smoking status: Never    Smokeless tobacco: Never   Vaping Use    Vaping status: Never Used   Substance and Sexual Activity    Alcohol use: Not Currently     Comment: socially    Drug use: Never    Sexual activity: Yes     Partners: Male   Other Topics Concern    Caffeine Concern No    Exercise No    Seat Belt No    Special Diet Yes     Comment: Vegetarian    Stress Concern No    Weight Concern No     Social Determinants of Health     Financial Resource Strain: Low Risk  (8/14/2024)    Financial Resource Strain     Difficulty of Paying Living Expenses: Not very hard     Med Affordability: No   Food Insecurity: No Food Insecurity (8/14/2024)    Food Insecurity     Food Insecurity: Never true   Transportation Needs: No Transportation Needs (8/14/2024)    Transportation Needs     Lack of Transportation: No     Car Seat:  Yes   Stress: No Stress Concern Present (8/14/2024)    Stress     Feeling of Stress : No   Housing Stability: Low Risk  (8/14/2024)    Housing Stability     Housing Instability: No     Crib or Bassinette: Yes              Review of Systems    Positive for stated Chief Complaint: Headache and Postop/Procedure Problem    Other systems are as noted in HPI.  Constitutional and vital signs reviewed.      All other systems reviewed and negative except as noted above.    Physical Exam     ED Triage Vitals [08/21/24 1503]   /89   Pulse 72   Resp 20   Temp 97.5 °F (36.4 °C)   Temp src Temporal   SpO2 100 %   O2 Device None (Room air)       Current Vitals:   Vital Signs  BP: 121/79  Pulse: 67  Resp: 16  Temp: 97.5 °F (36.4 °C)  Temp src: Temporal  MAP (mmHg): 92    Oxygen Therapy  SpO2: 99 %  O2 Device: None (Room air)            Physical Exam    Patient is alert orient x 3 no acute distress HEENT exam pupils are equal round react light extract muscles are intact oropharynx clear the neck is supple's no Noguchi lymphadenopathy or JVD lungs are clear cardiovascular exam shows regular rate and rhythm without murmurs abdomen soft and nontender skin is no rash.  Neuro exam  The back there is no area of swelling or tenderness in the lumbar region no fluctuance.    ED Course   Labs Reviewed - No data to display                   MDM      Initial differential diagnosis considered but not limited to includes spinal headache, migraine, intracranial hemorrhage,.      Anesthesia is consulted came down the did a blood patch the patient was feeling better was observed for an hour discharged as per anesthesia instructions.  I do not think her symptoms warranted any lab work or imaging.                                   Medical Decision Making      Disposition and Plan     Clinical Impression:  1. Spinal headache         Disposition:  Discharge  8/21/2024  6:12 pm    Follow-up:  No follow-up provider specified.        Medications  Prescribed:  Current Discharge Medication List

## 2024-08-21 NOTE — ED INITIAL ASSESSMENT (HPI)
Pt is one week postpartum. Pt started noticing upper back back and neck/headache. Pt reports muscle tightness. This started when she left the hospital last week Wednesday evening. The anesthesiologist followed up with pt and told her to come to the ER. Pt had epidural and vaginal delivery. Pt says her first epidural went wrong, filling up her whole body instead of lower extremities. Pt was given a second epidural which worked correctly.

## 2024-08-21 NOTE — TELEPHONE ENCOUNTER
Patient crying, not feeling well, having HA while sitting. She thinks she needs a blood patch.     Onset 1 week ago  History from notes:__________________________________  24 Had labor epidural,   8/15/24 Had positional headache, improved with hydration and tylenol.    24 Discharged from hospital   24 Spoke to OC MD: Experiencing HA was advised to go to ER   24 Patient TE with anesthesia option for conservative management vs blood patch was discussed.   ___________________________________________________    Advised to go to ER ASAP for evaluation/blood patch. Patient verbalized understanding, agreed to and intend to comply with plan of care.

## 2024-08-23 ENCOUNTER — NURSE ONLY (OUTPATIENT)
Dept: LACTATION | Facility: HOSPITAL | Age: 39
End: 2024-08-23
Attending: OBSTETRICS & GYNECOLOGY
Payer: COMMERCIAL

## 2024-08-23 DIAGNOSIS — O92.79 POOR LATCH ON, POSTPARTUM (HCC): ICD-10-CM

## 2024-08-23 PROCEDURE — 99212 OFFICE O/P EST SF 10 MIN: CPT

## 2024-09-25 ENCOUNTER — POSTPARTUM (OUTPATIENT)
Facility: CLINIC | Age: 39
End: 2024-09-25
Payer: COMMERCIAL

## 2024-09-25 VITALS
HEART RATE: 73 BPM | WEIGHT: 147 LBS | DIASTOLIC BLOOD PRESSURE: 64 MMHG | SYSTOLIC BLOOD PRESSURE: 102 MMHG | BODY MASS INDEX: 25.1 KG/M2 | HEIGHT: 64 IN

## 2024-09-25 DIAGNOSIS — M54.50 ACUTE LEFT-SIDED LOW BACK PAIN, UNSPECIFIED WHETHER SCIATICA PRESENT: ICD-10-CM

## 2024-09-25 PROCEDURE — 3074F SYST BP LT 130 MM HG: CPT | Performed by: OBSTETRICS & GYNECOLOGY

## 2024-09-25 PROCEDURE — 3008F BODY MASS INDEX DOCD: CPT | Performed by: OBSTETRICS & GYNECOLOGY

## 2024-09-25 PROCEDURE — 3078F DIAST BP <80 MM HG: CPT | Performed by: OBSTETRICS & GYNECOLOGY

## 2024-09-25 RX ORDER — ACETAMINOPHEN AND CODEINE PHOSPHATE 120; 12 MG/5ML; MG/5ML
0.35 SOLUTION ORAL DAILY
Qty: 28 TABLET | Refills: 11 | Status: SHIPPED | OUTPATIENT
Start: 2024-09-25 | End: 2025-09-25

## 2024-09-25 NOTE — PROGRESS NOTES
HPI    Mine Patel is a 39 year old female  here for 6 week post-partum visit.  Patient delivered a  male infant on 24 via .  Patient desires OCP for contraception.  Patient is breast feeding.   Patient denies symptoms of depression, Simpson  depression scale score of 0 out of 30.  Patient had spinal headache after delivery, had spinal patch. About 2 weeks ago started havinf lower back pain, primarily on the left    [place]  DEPRESSION SCALE  I have been able to laugh and see the funny side of things: As much as I always could  I have looked forward with enjoyment to things: As much as I ever did  I have been anxious or worried for no good reason: No, not at all  I have felt scared or panicky for no good reason: No, not at all  Things have been getting on top of me: No, I have been coping as well as ever  I have been so unhappy that I have had difficulty sleeping: Not at all  I have felt sad or miserable: No, not at all  I have been so unhappy that I have been crying: No, never  The thought of harming myself has occurred to me: Never  Total: 0    OBSTETRIC HISTORY  OB History    Para Term  AB Living   3 2 2   1 2   SAB IAB Ectopic Multiple Live Births         0 2      # Outcome Date GA Lbr [place]/2nd Weight Sex Type Anes PTL Lv   3 Term 24 40w1d 10:35 / 00:28 8 lb 4.6 oz (3.76 kg) M NORMAL SPONT EPI N WILIAM      Complications: Polyhydramnios   2 Term 21 39w0d  7 lb 10 oz (3.459 kg) M NORMAL SPONT   WILIAM      Birth Comments: has a hole in his heart, discovered at birth, can't do much until he is 2yo   1 AB               Obstetric Comments    - IOL. Child with cardiac defect.       Current - AMA, Fhx congenital heart defect in patient's 1st child, excessive weight gain, LGA fetus, mild polyhydramnios       GYNE HISTORY        MEDICATIONS:    Current Outpatient Medications:     acetaminophen 500 MG Oral Tab, Take 2 tablets (1,000 mg total) by  mouth every 6 (six) hours as needed., Disp: , Rfl:     [id number] OR, , Disp: , Rfl:     ibuprofen 600 MG Oral Tab, Take 1 tablet (600 mg total) by mouth every 6 (six) hours as needed for Pain. (Patient not taking: Reported on 9/25/2024), Disp: 30 tablet, Rfl: 0    ALLERGIES:  No Known Allergies    PHYSICAL EXAM  Blood pressure 102/64, pulse 73, height 64\", weight 147 lb (66.7 kg), last menstrual period 11/07/2023, currently breastfeeding.  General:  Well nourished, well developed woman in no acute distress  Abdomen:  soft, nontender, no masses  External Genitalia: normal appearance, hair distribution, and no lesions  Vagina:  Normal appearance without lesions, no abnormal discharge  Cervix:  Normal without tenderness on motion  Uterus: normal in size, contour, position, mobility, without tenderness  Adnexa: normal without masses or tenderness  Perineum: well healed perineum  Anus: no hemorroids       ASSESSMENT/PLAN  Mine was seen today for postpartum care.    Diagnoses and all orders for this visit:    Postpartum exam (HCC)    Acute left-sided low back pain, unspecified whether sciatica present  -     OP REFERRAL TO [facility]      Discussed all options of birthcontrol including ocps, minipill, Mirena or Paragard IUD, nuvaring, orthoevra patch, nexplanon, Depoprovera, condoms or tubal sterilization options. Patient has chosen OCP.  Patient to return for annual gyne exam in February.

## 2025-04-21 ENCOUNTER — OFFICE VISIT (OUTPATIENT)
Facility: CLINIC | Age: 40
End: 2025-04-21
Payer: COMMERCIAL

## 2025-04-21 VITALS
SYSTOLIC BLOOD PRESSURE: 106 MMHG | HEIGHT: 64 IN | WEIGHT: 149 LBS | BODY MASS INDEX: 25.44 KG/M2 | HEART RATE: 94 BPM | DIASTOLIC BLOOD PRESSURE: 84 MMHG

## 2025-04-21 DIAGNOSIS — N93.9 ABNORMAL UTERINE BLEEDING (AUB): ICD-10-CM

## 2025-04-21 DIAGNOSIS — N39.3 SUI (STRESS URINARY INCONTINENCE, FEMALE): ICD-10-CM

## 2025-04-21 DIAGNOSIS — Z71.85 HPV VACCINE COUNSELING: ICD-10-CM

## 2025-04-21 DIAGNOSIS — Z13.0 SCREENING, ANEMIA, DEFICIENCY, IRON: ICD-10-CM

## 2025-04-21 DIAGNOSIS — Z13.220 SCREENING FOR LIPID DISORDERS: ICD-10-CM

## 2025-04-21 DIAGNOSIS — N92.0 MENORRHAGIA WITH REGULAR CYCLE: ICD-10-CM

## 2025-04-21 DIAGNOSIS — Z12.4 SCREENING FOR CERVICAL CANCER: ICD-10-CM

## 2025-04-21 DIAGNOSIS — Z13.1 SCREENING FOR DIABETES MELLITUS: ICD-10-CM

## 2025-04-21 DIAGNOSIS — Z13.29 SCREENING FOR THYROID DISORDER: ICD-10-CM

## 2025-04-21 DIAGNOSIS — Z80.41 FAMILY HISTORY OF OVARIAN CANCER: ICD-10-CM

## 2025-04-21 DIAGNOSIS — Z30.09 GENERAL COUNSELING AND ADVICE FOR CONTRACEPTIVE MANAGEMENT: ICD-10-CM

## 2025-04-21 DIAGNOSIS — Z01.419 WELL WOMAN EXAM WITH ROUTINE GYNECOLOGICAL EXAM: Primary | ICD-10-CM

## 2025-04-21 PROBLEM — O36.63X0 EXCESSIVE FETAL GROWTH AFFECTING MANAGEMENT OF PREGNANCY IN THIRD TRIMESTER (HCC): Status: RESOLVED | Noted: 2024-08-12 | Resolved: 2025-04-21

## 2025-04-21 PROBLEM — O09.523 AMA (ADVANCED MATERNAL AGE) MULTIGRAVIDA 35+, THIRD TRIMESTER (HCC): Status: RESOLVED | Noted: 2024-01-12 | Resolved: 2025-04-21

## 2025-04-21 PROBLEM — O26.03 EXCESSIVE WEIGHT GAIN DURING PREGNANCY IN THIRD TRIMESTER (HCC): Status: RESOLVED | Noted: 2024-08-11 | Resolved: 2025-04-21

## 2025-04-21 PROBLEM — Z34.90 PREGNANCY (HCC): Status: RESOLVED | Noted: 2024-08-14 | Resolved: 2025-04-21

## 2025-04-21 PROBLEM — O09.523 MULTIGRAVIDA OF ADVANCED MATERNAL AGE IN THIRD TRIMESTER (HCC): Status: RESOLVED | Noted: 2024-08-14 | Resolved: 2025-04-21

## 2025-04-21 PROBLEM — N83.291 COMPLEX CYST OF RIGHT OVARY: Status: ACTIVE | Noted: 2022-04-25

## 2025-04-21 PROBLEM — D50.9 IRON DEFICIENCY ANEMIA: Status: ACTIVE | Noted: 2022-04-25

## 2025-04-21 PROBLEM — N83.291 COMPLEX CYST OF RIGHT OVARY: Status: RESOLVED | Noted: 2022-04-25 | Resolved: 2025-04-21

## 2025-04-21 PROBLEM — O40.9XX0 POLYHYDRAMNIOS, ANTEPARTUM COMPLICATION (HCC): Status: RESOLVED | Noted: 2024-08-12 | Resolved: 2025-04-21

## 2025-04-21 PROBLEM — D50.9 IRON DEFICIENCY ANEMIA: Status: RESOLVED | Noted: 2022-04-25 | Resolved: 2025-04-21

## 2025-04-21 NOTE — H&P
[facility]  Obstetrics and Gynecology   History & Physical  Est    The  [employer] Cures Act makes medical notes like these available to patients in the interest of transparency. Please be advised this is a medical document. Medical documents are intended to carry relevant information, facts as evident, and the clinical opinion of the practitioner. The medical note is intended as peer to peer communication and may appear blunt or direct. It is written in medical language and may contain abbreviations or verbiage that are unfamiliar.     Chief complaint:   Chief Complaint   Patient presents with    Wellness Visit     WWE  Last pap 2024 neg       Subjective:     HPI: Mine Patel is a 39 year old  with Patient's last menstrual period was 2025 (exact date).   Here for WWE.  Complaints:  Chaperone for exam declines    24  -  boy \"Reji\" AMA, Fhx congenital heart defect in patient's 1st child, excessive weight gain 38 lb, mild polyhydramnios.     2024 ED visit for blood patch per anesthesiologist.     24 Candi Arriaga DO Postpartum visit. Breastfeeding. Low back pain x 2 weeks. Desires OCP. PT ordered for low back pain. Rx POP mini pill micronor.     As of today,2025    Having some issues of back pain since having had the epidural after this most recent delivery  Having a hard time can't really do yoga  Has not seen a PCP for this  Has seen a chiropracter     Breastfeeding - 6 months. Done now.  Teething happened   Periods resumed after delivery? About 6-7 months or so after stopping breastfeeding.   Periods - has only had 2 periods since stopping breastfeeding.   Seem 28 days apart  Bleeds 4-5 days  All the days are heavy.   Has to change a super tampon every hour or so on the 1st day  Doesn't wear pad but should   On 1st night will often wear a pad through the night.   No clots but some residual on the tampon  Heavier than prior to the pregnancy   1st  period that resumed - no nausea  2nd period was nauseated. Cramping was mild. Didn't take anything for it.     Patient's last menstrual period was 04/14/2025 (exact date).     Sexually active? Very busy & working. Not time   Dyspareunia? NA  Postcoital bleeding? NA  Progestin only mini pill - didn't take it   Contraception: abstinence.   Plans for more kids? Probably not any more kids     Patient Care Team:  Pcp, None as PCP - General     Review of Systems   Gastrointestinal:  Negative for blood in stool, constipation and diarrhea.   Genitourinary:  Positive for menstrual problem. Negative for dysuria.        +AC if running in the past.   Has been getting better since delivery  PFPT - never    Musculoskeletal:  Positive for back pain and neck pain.        Upper back/right neck area    Skin:         Breasts - N   Psychiatric/Behavioral:          Working & has the 2 little kids. Very busy.   Some stress     GYN Hx:   Menarche 14 or 15 x monthly 28 days x 7 days x heavy flow x  cramping     [facility] [place] [facility]   04/12/2022 AUB, stress incontinence  4/25/2022 - complex cyst right ovary on problem list - right ovarian complex intraovarian cyst - 1.9cm No new concerns. Periods still heavy. 8 months post partum Apprehensive to be on OCPs due to friend having a fatal blood clot. Does want to have another pregnancy, unsure timing.   4/2022 Rx Lysteda   9/27/22 TVUS - no report available.     10/12/23 Pelvic US at 7w4d by LMP- retroverted uterus, right sided intramural uterine body fibroid 1.4 x 1.4 cm, Endomterium is thichened,measuring 15mm with tissue and vascular flow seen with.Finding are suspicous for retained  products of conceptiopn. right ovary adherent to the uterine body.. Consistent with miscarriage   1/18/24 OB ultrasound small fibroid 1.6 x 1.6 x 1.5 cm   3/28/24 MFM ultrasound at 20 wk - Uterus and adnexa appeared normal today on US    Patient's last menstrual  period was 2025 (exact date).       Hasn't really taken anything for the periods.     STDs: N  HPV vaccine - not sure     Cervical cancer screening:   No abn pap   Pap & HPV negative 24     Breast cancer screening:  Mammogram: N    Colon cancer screening:  Colonoscopy N    OB History:  OB History    Para Term  AB Living   3 2 2  1 2   SAB IAB Ectopic Multiple Live Births   1   0 2      # Outcome Date GA Lbr [place]/2nd Weight Sex Type Anes PTL Lv   3 Term 25 40w1d 10:35 / 00:28 8 lb 4.6 oz (3.76 kg) M NORMAL SPONT EPI N WILIAM      Complications: Polyhydramnios   2 SAB 10/12/23 7w4d    SAB   FD      Birth Comments: HCG was 15,792 -> decreasing. Pelvic US no IUP   1 Term 21 39w0d  7 lb 10 oz (3.459 kg) M NORMAL SPONT   WILIAM      Birth Comments: has a hole in his heart, discovered at birth, can't do much until he is 3yo      Obstetric Comments    - IOL. Child \"Fransico\" has hole in heart (may be ASD vs PFO?), no surgery, doing observation      10/12/23 Pelvic US at 7w4d by LMP- retroverted uterus, right sided intramural uterine body fibroid 1.4 x 1.4 cm, Endomterium is thichened,measuring 15mm with tissue and vascular flow seen with. Finding are suspicous for retained products of conceptiopn. right ovary adherent to the uterine body.. Consistent with miscarriage. HCG was 15,792 -> decreasing       24  - boy \"Reji\" AMA, Fhx congenital heart defect in patient's 1st child, excessive weight gain 38 lb, mild polyhydramnios. Postpartum - 2024 ED visit for blood patch per anesthesiologist. Baby's echocardiogram after delivery was normal       Medications:  Current Medications[1]   Prescriptions Prior to Admission[2]  Prior to admission med list:   Prescriptions Prior to Admission[3]  Inpatient Current Medication List:  Current Hospital Medications[4]  Scheduled Meds:   Scheduled Medications[5]  Continuous Infusions:   Medication Infusions[6]  PRN Medications:   PRN  Medications[7]    All:  Allergies[8]    PMH:  Past Medical History[9]     PSH:  Past Surgical History[10]    Social History:  Short Social Hx on File[11]     Family History:  Family History[12]    Immunization History:  Immunization History   Administered Date(s) Administered    Covid-19 Vaccine Moderna 100 mcg/0.5 ml 12/16/2021    FLUZONE 6 months and older PFS 0.5 ml (41674) 12/16/2021    TDAP 05/28/2024       Depression Scale   Total: 0 (9/25/2024 11:51 AM)    PHQ-2 SCORE: 0  , done 4/21/2025         Objective:     Vitals:    04/21/25 1116   BP: 106/84   Pulse: 94   Weight: 149 lb (67.6 kg)   Height: 64\"     Body mass index is 25.58 kg/m².    BP Readings from Last 10 Encounters:   04/21/25 106/84   09/25/24 102/64   08/21/24 112/86   08/16/24 113/75   08/12/24 104/58   08/06/24 114/52   07/29/24 104/66   07/24/24 100/72   07/16/24 102/66   07/10/24 100/62        Wt Readings from Last 10 Encounters:   04/21/25 149 lb (67.6 kg)   09/25/24 147 lb (66.7 kg)   08/21/24 150 lb (68 kg)   08/14/24 168 lb (76.2 kg)   08/12/24 168 lb 9.6 oz (76.5 kg)   08/06/24 168 lb (76.2 kg)   07/29/24 165 lb 6.4 oz (75 kg)   07/24/24 166 lb (75.3 kg)   07/16/24 164 lb 6.4 oz (74.6 kg)   07/10/24 161 lb 6.4 oz (73.2 kg)       BMI Readings from Last 10 Encounters:   04/21/25 25.58 kg/m²   09/25/24 25.23 kg/m²   08/21/24 25.75 kg/m²   08/14/24 28.84 kg/m²   08/12/24 28.94 kg/m²   08/06/24 28.84 kg/m²   07/29/24 28.39 kg/m²   07/24/24 28.49 kg/m²   07/16/24 28.22 kg/m²   07/10/24 27.70 kg/m²         Physical Exam:  Physical Exam    Labs:  Lab Results   Component Value Date    WBC 15.2 (H) 08/15/2024    RBC 3.52 (L) 08/15/2024    HGB 11.2 (L) 08/15/2024    HCT 32.4 (L) 08/15/2024    MCV 92.0 08/15/2024    MCH 31.8 08/15/2024    MCHC 34.6 08/15/2024    RDW 13.5 08/15/2024    .0 08/15/2024        No results found for: \"GLU\", \"BUN\", \"BUNCREA\", \"CREATSERUM\", \"ANIONGAP\", \"GFR\", \"GFRNAA\", \"GFRAA\", \"CA\", \"OSMOCALC\", \"ALKPHO\", \"AST\", \"ALT\",  \"ALKPHOS\", \"BILT\", \"TP\", \"ALB\", \"GLOBULIN\", \"AGRATIO\", \"NA\", \"K\", \"CL\", \"CO2\"    No results found for: \"CHOLEST\", \"TRIG\", \"HDL\", \"LDL\", \"VLDL\", \"TCHDLRATIO\", \"NONHDLC\", \"CHOLHDLRATIO\", \"CALCNONHDL\"     No results found for: \"T4F\", \"TSH\", \"TSHT4\"     No results found for: \"EAG\", \"A1C\"    Imaging:  No results found.     Assessment:     Patient Active Problem List    Diagnosis    AC (stress urinary incontinence, female)    Family history of ovarian cancer     in paternal grandmother.      Family history of congenital heart disease         Mine Patel is a 39 year old  female here for WWE. C/o some neck pain and AUB     Diagnoses and all orders for this visit:    Well woman exam with routine gynecological exam    Family history of ovarian cancer  -     OP REFERRAL TO GENETIC COUNSELOR    Screening for cervical cancer    General counseling and advice for contraceptive management    HPV vaccine counseling    Abnormal uterine bleeding (AUB)  -     US PELVIS W EV (CPT=76856/95606); Future    Menorrhagia with regular cycle  -     US PELVIS W EV (CPT=76856/20542); Future    Screening, anemia, deficiency, iron  -     CBC With Differential With Platelet; Future  -     Ferritin; Future    Screening for diabetes mellitus  -     Comp Metabolic Panel (14); Future  -     Hemoglobin A1C; Future    Screening for thyroid disorder  -     TSH W Reflex To Free T4; Future    Screening for lipid disorders  -     Lipid Panel; Future    AC (stress urinary incontinence, female)  -     OP REFERRAL TO [facility]         Plan:     AUB  -2025 uterus does not feel enlarged or heavy  -pelvic US ordered   -encouraged to cut back on alcohol as it is estrogenic in the body & could be contributing to heavier periods   -doesn't want to be on any medications or hormones. Stress/mood already tough enough    Upper back/right neck pain  -she says things started after her wet tap - was in low back but now more in  upper back  -has chiropracter & plans to do PT with them  -encouraged to get PCP    Screening labs ordered     Pap & HPV neg 1/12/24   -ASCCP pap guidelines reviewed - up to date   -Patient desires pap is not done today, 4/21/2025     Mammogram - start at 40   Breast exam benign 4/21/2025     HPV vaccine not sure - information given   STD screening: Not concerned.  Colonoscopy - start at 45      Contraception. Condoms encouraged.    Plans for kids? no    Carrier screening done? Per patient was negative in previous pregnancy in [place]    Future pregnancies: AMA, Fhx congenital heart defect, H/o polyhydramnios at term    BMI overweight   -healthy diet & exercise encouraged    Fhx ovarian cancer in paternal grandmother  -genetic counseling  ordered      RTC well woman exam due after 4/21/26    Ebony Lutz MD  [facility] - OBGYN         [1]   Current Outpatient Medications   Medication Sig Dispense Refill    Norethindrone (ORTHO MICRONOR) 0.35 MG Oral Tab Take 1 tablet (0.35 mg total) by mouth daily. (Patient not taking: Reported on 4/21/2025) 28 tablet 11    acetaminophen 500 MG Oral Tab Take 2 tablets (1,000 mg total) by mouth every 6 (six) hours as needed.      [id number] OR  (Patient not taking: Reported on 4/21/2025)     [2] (Not in a hospital admission)   [3] (Not in a hospital admission)   [4]   No current facility-administered medications for this visit.   [5] [6] [7] [8] No Known Allergies  [9]   Past Medical History:  Diagnosis Date    Abnormal uterine bleeding 10/07/2023    Back pain 08/2024    since wet tap epidural    Complex cyst of right ovary 04/25/2022    right ovarian complex intraovarian cyst - 1.9cm per outside record    Family history of congenital heart defect 2021    Patient's first child born with hole in heart - no surgery    Family history of ovarian cancer     in paternal grandmother.    Fibroid 10/12/2023    Small fibroid 1.4 cm intramural, right uterine body  noted at 1st trimester OB ultrasound.    Iron deficiency anemia 04/25/2022    with AUB- menorrhagia    Menorrhagia 04/2022    Polyhydramnios (HCC) 08/12/2024 8/12/2024 - 39w6d - EFW 4116g (9lb1oz) ± 601g. EFW 92%, AC 96%. NARDA 26.2 cm    Post-dural puncture headache 08/21/2024 8/21/2024 ED visit for blood patch per anesthesiologist.    Stress incontinence, female 04/2022    Vegetarian diet     all her life    Wears glasses    [10] History reviewed. No pertinent surgical history.  [11]   Social History  Socioeconomic History    Marital status:    Tobacco Use    Smoking status: Never    Smokeless tobacco: Never   Vaping Use    Vaping status: Never Used   Substance and Sexual Activity    Alcohol use: Yes     Alcohol/week: 8.0 standard drinks of alcohol     Types: 8 Glasses of wine per week     Comment: usually 1-2 glasses of wine with dinner daily    Drug use: Never    Sexual activity: Not Currently     Partners: Male   Other Topics Concern    Caffeine Concern No    Exercise No    Seat Belt No    Special Diet Yes     Comment: Vegetarian    Stress Concern No    Weight Concern No     Social Drivers of Health     Food Insecurity: No Food Insecurity (8/14/2024)    Food Insecurity     Food Insecurity: Never true   Transportation Needs: Unknown (8/14/2024)    Transportation Needs     Car Seat: Yes   Stress: No Stress Concern Present (8/14/2024)    Stress     Feeling of Stress : No   Housing Stability: Low Risk  (8/14/2024)    Housing Stability     Housing Instability: No     Crib or Bassinette: Yes   [12]   Family History  Problem Relation Age of Onset    Hypertension Father     Hyperlipidemia Maternal Grandfather     Ovarian Cancer Paternal Grandmother         70s    Birth Defects Son         hole in heart, no surgery    Breast Cancer Paternal Aunt         50s    Colon Cancer Neg     Pancreatic Cancer Neg     Prostate Cancer Neg     Diabetes Neg     Thyroid disease Neg     Blood Clotting Disorder Neg     DVT/VTE  Neg

## 2025-04-21 NOTE — PATIENT INSTRUCTIONS
Pelvic ultrasound  -can check on cycle day 5-9       Genetic counselor    Mariel Roche   [address], [address]  [place] [address]    Ph: [phone]  F: [phone]      Please establish care with a primary care physician if you do not already have one.     To establish care with a primary care physician or specialist, please call the [facility] Physician Referral line at [phone] or visit [email or web address]       [facility]    [address], [address], [place] [address]. Ph: [phone]  [address], [address], [address] [place] [address]. Ph: [phone] & [phone]  [address] [place] [address]. Ph [phone]  [address], [place] [address]. Ph [phone]  [address] [place] [address]. Ph [phone]  [address] [place] [address]. Ph [phone]     The Role of Physical Therapy in the Treatment of Pelvic Floor Dysfunction:    Physical therapists are trained to evaluate and treat dysfunctions in the joints, muscles, nerves and scar. Physical therapists specifically trained in the area of pelvic health can identify the possible musculoskeletal causes of pelvic pain, bladder and bowel difficulties and develop a treatment plan specific to the individual suffering from this difficulties.     What to expect at your first physical therapy appointment:   Your first visit will include an initial evaluation in a comfortable, private room by a therapist who has undergone advanced education and training in the evaluation and treatment of pelvic muscle dysfunction. The therapist will obtain a detailed history of your health, pain and activity limitations. She will also ask you about any bowel, bladder and sexual difficulties as these are in part controlled by the pelvic muscles. The therapist will then take a look at your posture, mobility of your spine and hips and strength and flexibility of  pelvic girdle muscles. She will examine any scar tissue and trigger points in the muscles of your pelvic region.     The therapist will also specifically examine the pelvic floor muscles. Your pelvic floor consists of a group of muscles that attach behind the pubic bone in the front to the tail bone in the back. They are responsible for providing support to the pelvic joints and organs, relaxing to allow the passage of urine, stool and gas and rhiannon to prevent the loss of urine, stool and gas as appropriate. In order to best examine these muscles you will be asked to undress from the waist down and be covered with a sheet. The therapist will use a lubricated, gloved finger to identify painful muscles around and in your vagina or rectum then instruct you to contract and relax these muscles in order to determine how the muscles are functioning. Care is taken to make you as comfortable as possible with the exam.     Your therapist will discuss the evaluation results with you and provide you with education regarding your specific condition and the expectation of therapy. She will answer all of your questions and will work with you to establish a treatment plan based on the results of the evaluation and your goals for therapy.    Options for home:   Perifit device for pelvic floor strengthening   Pelvic wand by intimate mauro for high pelvic floor tone, desensitization       Patient Information about GARDASIL®9 (pronounced “jvyy-Ny-pyet n?n”)  (Human Papillomavirus 9-valent Vaccine, Recombinant)    IT IS RECOMMENDED THAT YOU CHECK WITH YOUR INSURANCE ABOUT COVERAGE FOR THIS VACCINE PRIOR TO ITS ADMINISTRATION    Read this information with care before getting GARDASIL®9. You or your child (the person getting  GARDASIL 9) will need 2 or 3 doses of the vaccine, depending on how old you are. It is important to read  this information before getting each dose. This information does not take the place of talking with  your  health care professional about GARDASIL 9.    What is GARDASIL 9?    GARDASIL 9 is a vaccine (injection/shot) given to individuals 9 through 45 years of age to help protect  against diseases caused by some types of Human Papillomavirus (HPV).    What diseases can GARDASIL 9 help protect against?    In girls and women 9 through 45 years of age, GARDASIL 9 helps protect against:  ? Cervical cancer  ? Vulvar and vaginal cancers  ? Anal cancer  ? Certain head and neck cancers, such as throat and back of mouth cancers  ? Precancerous cervical, vulvar, vaginal and anal lesions  ? Genital warts  In boys and men 9 through 45 years of age, GARDASIL 9 helps protect against:  ? Anal cancer  ? Certain head and neck cancers, such as throat and back of mouth cancers  ? Precancerous anal lesions  ? Genital warts    These diseases may have many causes, including HPV Types 6, 11, 16, 18, 31, 33, 45, 52, and 58.  GARDASIL 9 only protects against diseases caused by these nine types of HPV.  People cannot get HPV or any of these diseases from GARDASIL 9.    What important information about GARDASIL 9 should I know?    GARDASIL 9:  ? Does not remove the need for screening for cervical, vulvar, vaginal, anal, and certain head and  neck cancers, such as throat and back of mouth cancers as recommended by a health care  professional; women should still get routine cervical cancer screening.  ? Does not protect the person getting GARDASIL 9 from a disease that is caused by other types of  HPV, other viruses or bacteria.  ? Does not treat HPV infection.  ? Does not protect the person getting GARDASIL 9 from HPV types that he/she may already have.  GARDASIL 9 may not fully protect each person who gets it.    Who should not get GARDASIL 9?    Anyone with an allergic reaction to:  ? A previous dose of GARDASIL 9  ? A previous dose of GARDASIL®  ? Yeast (severe allergic reaction)  ? Amorphous aluminum hydroxyphosphate sulfate  ? Polysorbate  80    What should I tell the health care professional before getting GARDASIL 9?    Tell the health care professional if you or your child (the person getting GARDASIL 9):  ? Are pregnant or planning to get pregnant.  ? Have immune problems, like HIV or cancer.  ? Take medicines that affect the immune system.  ? Have a fever over 100°F (37.8°C).  ? Might have had an allergic reaction to a previous dose of GARDASIL 9 or GARDASIL.  ? Take any medicines, even those you can buy over the counter.  The health care professional will help decide if you or your child should get the vaccine.    How is GARDASIL 9 given?    GARDASIL 9 is a shot that is usually given in the arm muscle. GARDASIL 9 may be given as 2 or 3  shots.  For persons who are   9 through 14 years old   2-shots* Dose 1: first shot  Dose 2: second shot given between 6 and 12 months  after the first shot    or 3-shots** Dose 1: first shot  Dose 2: second shot given 2 months after the first shot  Dose 3: third shot given 6 months after the first shot    15 through 45 years old 3-shots   Dose 1: first shot  Dose 2: second shot given 2 months after the first shot  Dose 3: third shot given 6 months after the first shot    *If the second shot is given earlier than 5 months after the first shot, you will need to get a third shot at  least 4 months after the second shot was given.    **The need to use a 3-dose schedule instead of a 2-dose schedule will be determined by your health care  Professional.    Make sure that you or your child gets all doses recommended by your health care professional so that  you or your child gets the best protection. If the person getting GARDASIL 9 misses a dose, tell the health  care professional and they will decide when to give the missed dose. It is important that you follow the  instructions of your health care professional regarding return visits for the follow-up doses.  Fainting can happen after getting an HPV vaccine. Sometimes  people who faint can fall and hurt  themselves. For this reason, the health care professional may ask the person getting GARDASIL 9 to sit  or lie down for 15 minutes after getting the vaccine. Some people who faint might shake or become stiff.  The health care professional may need to treat the person getting GARDASIL 9.    Can I get GARDASIL 9 if I have already gotten GARDASIL?    If you have already gotten GARDASIL, talk to your health care professional to see if GARDASIL 9 is right  for you.    Can I get GARDASIL 9 with other vaccines?    GARDASIL 9 can be given at the same time as:  ? Menactra [Meningococcal (Groups A, C, Y and W-135) Polysaccharide Diphtheria Toxoid  Conjugate Vaccine]  ? Adacel [Tetanus Toxoid, Reduced Diphtheria Toxoid and Acellular Pertussis Vaccine Adsorbed  (Tdap)]    What are the possible side effects of GARDASIL 9?    The most common side effects seen with GARDASIL 9 are:  ? pain, swelling, redness, itching, bruising, bleeding, and a lump where you got the shot  ? headache  ? fever  ? nausea  ? dizziness  ? tiredness  ? diarrhea  ? abdominal pain  ? sore throat    Studies show that there was more swelling where the shot was given when GARDASIL 9 was given at  the same time as Menactra and/or Adacel.    Tell the health care professional if you have any of these problems because these may be signs of an  allergic reaction:  ? difficulty breathing  ? wheezing (bronchospasm)  ? hives  ? rash    Additional side effects that have been reported during general use for GARDASIL 9 are shown below.  Side effects reported during the general use of GARDASIL are also shown below. GARDASIL side effects  are reported as they may be relevant to GARDASIL 9 since the vaccines are similar in composition.  GARDASIL 9  ? vomiting  ? hives    Additionally, these side effects have been seen with the general use of GARDASIL.  ? swollen glands (neck, armpit, or groin)  ? joint pain  ? unusual tiredness,  weakness, or confusion  ? chills  ? generally feeling unwell  ? leg pain  ? shortness of breath  ? chest pain  ? aching muscles  ? muscle weakness  ? seizure  ? bad stomach ache  ? bleeding or bruising more easily than normal  ? skin infection    You should contact your health care professional right away if you get any symptoms that bother you.  For a more complete list of side effects, ask the health care professional.    Call your health care professional for medical advice about side effects. You may also report any side  effects to your doctor or directly to Vaccine Adverse Event Reporting System (VAERS). The VAERS tollfree number is 1-929.216.5389 or report online to [email or web address].    GARDASIL 9 was not studied in women who knew they were pregnant. A pregnancy registry is available.  You are encouraged to contact the registry as soon as you become aware of your pregnancy by calling [phone]- [phone], or ask your health care professional to contact the registry for you.    What is in GARDASIL 9?    GARDASIL 9 contains:  ? Proteins of HPV Types 6, 11, 16, 18, 31, 33, 45, 52, and 58  ? Amorphous aluminum hydroxyphosphate sulfate  ? Yeast protein  ? Sodium chloride  ? L-histidine  ? Polysorbate 80  ? Sodium borate  ? Water    This document is a summary of information about GARDASIL 9.    To learn more about GARDASIL 9, please talk to the health care professional or visit  [email or web address].    For patent information: [email or web address]  The trademarks depicted herein are owned by their respective companies.  Copyright © 2786-2883 [employer] a subsidiary of [employer].  All rights reserved.  Revised: 06/2020  [id number]

## (undated) NOTE — Clinical Note
She was quite anxious not knowing what the plan for the end of the pregnancy is.  States last induction was a nightmare.  Would like to go into labor naturally but does want an epidural in the hospital.

## (undated) NOTE — LETTER
Dear New MomFortino, we missed you! The nurses of [facility] have tried to reach you by phone to ask if you have any questions regarding your health or the health and care of your new little one.    We hope you are doing well. If, for any reason, you have questions or concerns about your health or your baby’s health, please contact your provider or your pediatrician or family medicine physician regarding your baby.     At [facility], we feel that postpartum support is very important for new families. Please see the enclosed new parent support flyer that lists support programs and resources with both in-person and online options.     Additionally, our Breastfeeding Centers at [facility] and [facility] in [place], offer outpatient visits with our International Board-Certified Lactation   Consultants (IBCLCs) for any breastfeeding concerns or questions you may have.    For issues related to stress, anxiety or depression, we have a Nurturing Mom support group that meets both in-person or online.  There’s also a 24-hour Mom’s Line where you can request a phone call from a clinical therapist for assistance for postpartum depression.    We encourage you to take advantage of these programs and resources as you recover from childbirth and learn to care for your new infant.    Best wishes,    [facility]            [id number]